# Patient Record
Sex: FEMALE | Race: WHITE | Employment: FULL TIME | URBAN - METROPOLITAN AREA
[De-identification: names, ages, dates, MRNs, and addresses within clinical notes are randomized per-mention and may not be internally consistent; named-entity substitution may affect disease eponyms.]

---

## 2023-03-15 ENCOUNTER — OFFICE VISIT (OUTPATIENT)
Dept: ORTHOPEDIC SURGERY | Age: 53
End: 2023-03-15
Payer: COMMERCIAL

## 2023-03-15 DIAGNOSIS — G89.29 CHRONIC PAIN OF LEFT ANKLE: Primary | ICD-10-CM

## 2023-03-15 DIAGNOSIS — M76.822 TIBIALIS TENDINITIS OF LEFT LOWER EXTREMITY: ICD-10-CM

## 2023-03-15 DIAGNOSIS — M25.572 CHRONIC PAIN OF LEFT ANKLE: Primary | ICD-10-CM

## 2023-03-15 PROCEDURE — 99214 OFFICE O/P EST MOD 30 MIN: CPT | Performed by: ORTHOPAEDIC SURGERY

## 2023-03-15 RX ORDER — PREDNISONE 10 MG/1
TABLET ORAL
COMMUNITY
Start: 2023-02-13

## 2023-03-15 NOTE — PROGRESS NOTES
Name: Cortney Correa  YOB: 1970  Gender: female  MRN: [id number]    Summary:   Left stage II posterior tibial tendon insufficiency       CC: New Patient (Left ankle  x-rays taken in office today)       HPI: Cortney Correa is a 45 y.o. female who presents with New Patient (Left ankle  x-rays taken in office today)  . Patient presents the office today with a 3 to 4-month history of worsening progressive flatfoot deformity and pain. She said she has tried to cortisone shots placed into the tendon area. She states she got some relief in the first were not for the second 1. She is tried bracing for this as well as anti-inflammatories with progressive worsening pain and deformity in the left foot and ankle. History was obtained by Patient     ROS/Meds/PSH/PMH/FH/SH: I personally reviewed the patients standard intake form. Below are the pertinents    Tobacco:  reports that she has never smoked. She does not have any smokeless tobacco history on file. Diabetes: None      Physical Examination:    Exam of the left lower extremity shows a planovalgus hindfoot with exquisite swelling and tenderness to palpation of the posterior tibial tendon. She is able to perform a double leg toe rise but cannot perform a single-leg toe rise. The hindfoot does invert nicely with good subtalar joint range of motion. There is no sinus Tarsi pain. She has palpable pulses and intact sensation. Imaging:   I independently interpreted XR taken today  Left ankle XR: AP, Lateral, Oblique views     ICD-10-CM    1. Chronic pain of left ankle  M25.572 XR ANKLE LEFT (MIN 3 VIEWS)    G89.29       2.  Tibialis tendinitis of left lower extremity  O66.453          Report: AP, lateral, oblique x-ray of the left ankle demonstrates increased talar first metatarsal angle and peritalar subluxation    Impression: Creased talar first metatarsal angle and peritalar subluxation   Last Briones III, MD           Assessment: Left stage II posterior tibial tendon insufficiency    Treatment Plan:   4 This is a chronic illness/condition with exacerbation and progression  Treatment at this time: Elective major surgery with procedural risk factors  Studies ordered: NO XR needed @ Next Visit    Weight-bearing status: WBAT        Return to work/work restrictions: none  No medications given    Left Spring ligament reconstruction with posterior tibial tendon reconstruction and possible flexor digitorum longus tendon transfer, medial calcaneal slide osteotomy, gastrocnemius recession, and possible cotton osteotomy  Outpatient-1 hour-sagittal saw with long blade, Arthrex 6.5 millimeter screws, C arm, possible Bio-Tenodesis set    The patient understands the diagnosis with conservative and surgical treatment. Surgery, the risks and complications, and the expected postoperative course were all outlined. Understands generalized risks and complications associated with any surgery, but specifically with the posterior tibial tendon repair/reconstruction with or without Achilles lengthening in the calcaneal osteotomy, understands malposition, malunion, and/or delayed union, any of which may require repeat surgical treatment. Understands the risk of skin and deep infection, the risk of bone infection, and the use of internal and possible external fixation that may require future hardware removal.  Understands the goal of surgery is attempted realignment and pain reduction with tendon healing but not complete pain relief. Understands posterior tibial tendon failure and the possible need to proceed with a fusion or long-term use of a brace or insole. Understands the risks of skin and deep infection, the risk of bone infection. Patient accepts and would like to proceed.

## 2023-03-20 ENCOUNTER — TELEPHONE (OUTPATIENT)
Dept: ORTHOPEDIC SURGERY | Age: 53
End: 2023-03-20

## 2023-03-20 NOTE — TELEPHONE ENCOUNTER
Pt  ask to speak w/ lizet and  please  check  to  see if  any of  these  surgery  dates  are available.   4/7 4/14  or  4/21

## 2023-03-23 DIAGNOSIS — M76.822 TIBIALIS TENDINITIS OF LEFT LOWER EXTREMITY: Primary | ICD-10-CM

## 2023-03-23 RX ORDER — IBUPROFEN 200 MG
200 TABLET ORAL EVERY 6 HOURS PRN
Status: ON HOLD | COMMUNITY
End: 2023-04-07 | Stop reason: HOSPADM

## 2023-03-23 RX ORDER — CALCIUM CARBONATE 200(500)MG
1 TABLET,CHEWABLE ORAL
COMMUNITY

## 2023-04-06 NOTE — PROGRESS NOTES
Preop department called to notify patient of arrival time for scheduled procedure. Instructions given to   - Arrive at [address]. - Remain NPO after midnight, unless otherwise indicated, including gum, mints, and ice chips. - Have a responsible adult to drive patient to the hospital, stay during surgery, and patient will need supervision 24 hours after anesthesia. - Use antibacterial soap in shower the night before surgery and on the morning of surgery.        Was patient contacted: yes, self  Voicemail left: n/a  Numbers contacted: [phone]   Arrival time: 1000

## 2023-04-07 ENCOUNTER — APPOINTMENT (OUTPATIENT)
Dept: GENERAL RADIOLOGY | Age: 53
End: 2023-04-07
Attending: ORTHOPAEDIC SURGERY
Payer: COMMERCIAL

## 2023-04-07 ENCOUNTER — HOSPITAL ENCOUNTER (OUTPATIENT)
Age: 53
Setting detail: OUTPATIENT SURGERY
Discharge: HOME | End: 2023-04-07
Attending: ORTHOPAEDIC SURGERY | Admitting: ORTHOPAEDIC SURGERY
Payer: COMMERCIAL

## 2023-04-07 ENCOUNTER — ANESTHESIA (OUTPATIENT)
Dept: SURGERY | Age: 53
End: 2023-04-07
Payer: COMMERCIAL

## 2023-04-07 ENCOUNTER — ANESTHESIA EVENT (OUTPATIENT)
Dept: SURGERY | Age: 53
End: 2023-04-07
Payer: COMMERCIAL

## 2023-04-07 VITALS
HEIGHT: 64 IN | SYSTOLIC BLOOD PRESSURE: 166 MMHG | WEIGHT: 293 LBS | RESPIRATION RATE: 16 BRPM | TEMPERATURE: 97.3 F | HEART RATE: 76 BPM | DIASTOLIC BLOOD PRESSURE: 78 MMHG | OXYGEN SATURATION: 99 % | BODY MASS INDEX: 50.02 KG/M2

## 2023-04-07 DIAGNOSIS — M76.822 TIBIALIS TENDINITIS OF LEFT LOWER EXTREMITY: ICD-10-CM

## 2023-04-07 DIAGNOSIS — G89.18 ACUTE POST-OPERATIVE PAIN: Primary | ICD-10-CM

## 2023-04-07 PROCEDURE — 3700000000 HC ANESTHESIA ATTENDED CARE: Performed by: ORTHOPAEDIC SURGERY

## 2023-04-07 PROCEDURE — C1769 GUIDE WIRE: HCPCS | Performed by: ORTHOPAEDIC SURGERY

## 2023-04-07 PROCEDURE — 6360000002 HC RX W HCPCS: Performed by: NURSE PRACTITIONER

## 2023-04-07 PROCEDURE — 3600000004 HC SURGERY LEVEL 4 BASE: Performed by: ORTHOPAEDIC SURGERY

## 2023-04-07 PROCEDURE — 6360000002 HC RX W HCPCS: Performed by: ANESTHESIOLOGY

## 2023-04-07 PROCEDURE — 64447 NJX AA&/STRD FEMORAL NRV IMG: CPT | Performed by: ANESTHESIOLOGY

## 2023-04-07 PROCEDURE — 7100000011 HC PHASE II RECOVERY - ADDTL 15 MIN: Performed by: ORTHOPAEDIC SURGERY

## 2023-04-07 PROCEDURE — 2580000003 HC RX 258: Performed by: ANESTHESIOLOGY

## 2023-04-07 PROCEDURE — 6360000002 HC RX W HCPCS: Performed by: NURSE ANESTHETIST, CERTIFIED REGISTERED

## 2023-04-07 PROCEDURE — 7100000010 HC PHASE II RECOVERY - FIRST 15 MIN: Performed by: ORTHOPAEDIC SURGERY

## 2023-04-07 PROCEDURE — 64445 NJX AA&/STRD SCIATIC NRV IMG: CPT | Performed by: ANESTHESIOLOGY

## 2023-04-07 PROCEDURE — 7100000000 HC PACU RECOVERY - FIRST 15 MIN: Performed by: ORTHOPAEDIC SURGERY

## 2023-04-07 PROCEDURE — 6370000000 HC RX 637 (ALT 250 FOR IP): Performed by: ANESTHESIOLOGY

## 2023-04-07 PROCEDURE — 2500000003 HC RX 250 WO HCPCS: Performed by: ANESTHESIOLOGY

## 2023-04-07 PROCEDURE — 3600000014 HC SURGERY LEVEL 4 ADDTL 15MIN: Performed by: ORTHOPAEDIC SURGERY

## 2023-04-07 PROCEDURE — C1713 ANCHOR/SCREW BN/BN,TIS/BN: HCPCS | Performed by: ORTHOPAEDIC SURGERY

## 2023-04-07 PROCEDURE — 2720000010 HC SURG SUPPLY STERILE: Performed by: ORTHOPAEDIC SURGERY

## 2023-04-07 PROCEDURE — 7100000001 HC PACU RECOVERY - ADDTL 15 MIN: Performed by: ORTHOPAEDIC SURGERY

## 2023-04-07 PROCEDURE — 2500000003 HC RX 250 WO HCPCS: Performed by: NURSE ANESTHETIST, CERTIFIED REGISTERED

## 2023-04-07 PROCEDURE — 2709999900 HC NON-CHARGEABLE SUPPLY: Performed by: ORTHOPAEDIC SURGERY

## 2023-04-07 PROCEDURE — 3700000001 HC ADD 15 MINUTES (ANESTHESIA): Performed by: ORTHOPAEDIC SURGERY

## 2023-04-07 DEVICE — SCREW BNE L55MM DIA6.7MM THRD L18MM FT ANK TI CANN LO PROF: Type: IMPLANTABLE DEVICE | Site: FOOT | Status: FUNCTIONAL

## 2023-04-07 DEVICE — SCREW INTFR L10MM DIA4MM BIOCOMPOSITE BIO-TENODESIS: Type: IMPLANTABLE DEVICE | Site: FOOT | Status: FUNCTIONAL

## 2023-04-07 DEVICE — WEDGE ANK D20MM THK7.5MM COT TECH TI PORUS ANAT 3D OPN: Type: IMPLANTABLE DEVICE | Site: FOOT | Status: FUNCTIONAL

## 2023-04-07 RX ORDER — SUCCINYLCHOLINE/SOD CL,ISO/PF 200MG/10ML
SYRINGE (ML) INTRAVENOUS PRN
Status: DISCONTINUED | OUTPATIENT
Start: 2023-04-07 | End: 2023-04-07 | Stop reason: SDUPTHER

## 2023-04-07 RX ORDER — OXYCODONE HYDROCHLORIDE 5 MG/1
5 TABLET ORAL
Status: COMPLETED | OUTPATIENT
Start: 2023-04-07 | End: 2023-04-07

## 2023-04-07 RX ORDER — FENTANYL CITRATE 50 UG/ML
INJECTION, SOLUTION INTRAMUSCULAR; INTRAVENOUS PRN
Status: DISCONTINUED | OUTPATIENT
Start: 2023-04-07 | End: 2023-04-07 | Stop reason: SDUPTHER

## 2023-04-07 RX ORDER — MIDAZOLAM HYDROCHLORIDE 1 MG/ML
4 INJECTION, SOLUTION INTRAMUSCULAR; INTRAVENOUS
Status: COMPLETED | OUTPATIENT
Start: 2023-04-07 | End: 2023-04-07

## 2023-04-07 RX ORDER — SODIUM CHLORIDE, SODIUM LACTATE, POTASSIUM CHLORIDE, CALCIUM CHLORIDE 600; 310; 30; 20 MG/100ML; MG/100ML; MG/100ML; MG/100ML
INJECTION, SOLUTION INTRAVENOUS CONTINUOUS
Status: DISCONTINUED | OUTPATIENT
Start: 2023-04-07 | End: 2023-04-07 | Stop reason: HOSPADM

## 2023-04-07 RX ORDER — ROPIVACAINE HYDROCHLORIDE 10 MG/ML
INJECTION EPIDURAL; INFILTRATION; PERINEURAL PRN
Status: DISCONTINUED | OUTPATIENT
Start: 2023-04-07 | End: 2023-04-07 | Stop reason: SDUPTHER

## 2023-04-07 RX ORDER — LIDOCAINE HYDROCHLORIDE 10 MG/ML
1 INJECTION, SOLUTION INFILTRATION; PERINEURAL
Status: DISCONTINUED | OUTPATIENT
Start: 2023-04-07 | End: 2023-04-07 | Stop reason: HOSPADM

## 2023-04-07 RX ORDER — LIDOCAINE HYDROCHLORIDE 20 MG/ML
INJECTION, SOLUTION EPIDURAL; INFILTRATION; INTRACAUDAL; PERINEURAL PRN
Status: DISCONTINUED | OUTPATIENT
Start: 2023-04-07 | End: 2023-04-07 | Stop reason: SDUPTHER

## 2023-04-07 RX ORDER — OXYCODONE HYDROCHLORIDE 5 MG/1
5 TABLET ORAL EVERY 6 HOURS PRN
Qty: 20 TABLET | Refills: 0 | Status: SHIPPED | OUTPATIENT
Start: 2023-04-07 | End: 2023-04-12

## 2023-04-07 RX ORDER — ROCURONIUM BROMIDE 10 MG/ML
INJECTION, SOLUTION INTRAVENOUS PRN
Status: DISCONTINUED | OUTPATIENT
Start: 2023-04-07 | End: 2023-04-07 | Stop reason: SDUPTHER

## 2023-04-07 RX ORDER — FENTANYL CITRATE 50 UG/ML
100 INJECTION, SOLUTION INTRAMUSCULAR; INTRAVENOUS
Status: COMPLETED | OUTPATIENT
Start: 2023-04-07 | End: 2023-04-07

## 2023-04-07 RX ORDER — SODIUM CHLORIDE 9 MG/ML
INJECTION, SOLUTION INTRAVENOUS PRN
Status: DISCONTINUED | OUTPATIENT
Start: 2023-04-07 | End: 2023-04-07 | Stop reason: HOSPADM

## 2023-04-07 RX ORDER — PROPOFOL 10 MG/ML
INJECTION, EMULSION INTRAVENOUS PRN
Status: DISCONTINUED | OUTPATIENT
Start: 2023-04-07 | End: 2023-04-07 | Stop reason: SDUPTHER

## 2023-04-07 RX ORDER — SODIUM CHLORIDE 0.9 % (FLUSH) 0.9 %
5-40 SYRINGE (ML) INJECTION PRN
Status: DISCONTINUED | OUTPATIENT
Start: 2023-04-07 | End: 2023-04-07 | Stop reason: HOSPADM

## 2023-04-07 RX ORDER — ROPIVACAINE HYDROCHLORIDE 5 MG/ML
INJECTION, SOLUTION EPIDURAL; INFILTRATION; PERINEURAL
Status: COMPLETED | OUTPATIENT
Start: 2023-04-07 | End: 2023-04-07

## 2023-04-07 RX ORDER — DEXAMETHASONE SODIUM PHOSPHATE 4 MG/ML
INJECTION, SOLUTION INTRA-ARTICULAR; INTRALESIONAL; INTRAMUSCULAR; INTRAVENOUS; SOFT TISSUE PRN
Status: DISCONTINUED | OUTPATIENT
Start: 2023-04-07 | End: 2023-04-07 | Stop reason: SDUPTHER

## 2023-04-07 RX ORDER — EPHEDRINE SULFATE/0.9% NACL/PF 50 MG/5 ML
SYRINGE (ML) INTRAVENOUS PRN
Status: DISCONTINUED | OUTPATIENT
Start: 2023-04-07 | End: 2023-04-07 | Stop reason: SDUPTHER

## 2023-04-07 RX ORDER — CEPHALEXIN 500 MG/1
500 CAPSULE ORAL 4 TIMES DAILY
Qty: 12 CAPSULE | Refills: 0 | Status: SHIPPED | OUTPATIENT
Start: 2023-04-07

## 2023-04-07 RX ORDER — ONDANSETRON 2 MG/ML
4 INJECTION INTRAMUSCULAR; INTRAVENOUS
Status: DISCONTINUED | OUTPATIENT
Start: 2023-04-07 | End: 2023-04-07 | Stop reason: HOSPADM

## 2023-04-07 RX ORDER — ONDANSETRON 2 MG/ML
INJECTION INTRAMUSCULAR; INTRAVENOUS PRN
Status: DISCONTINUED | OUTPATIENT
Start: 2023-04-07 | End: 2023-04-07 | Stop reason: SDUPTHER

## 2023-04-07 RX ORDER — SODIUM CHLORIDE 0.9 % (FLUSH) 0.9 %
5-40 SYRINGE (ML) INJECTION EVERY 12 HOURS SCHEDULED
Status: DISCONTINUED | OUTPATIENT
Start: 2023-04-07 | End: 2023-04-07 | Stop reason: HOSPADM

## 2023-04-07 RX ORDER — HYDROMORPHONE HYDROCHLORIDE 2 MG/ML
0.5 INJECTION, SOLUTION INTRAMUSCULAR; INTRAVENOUS; SUBCUTANEOUS EVERY 5 MIN PRN
Status: DISCONTINUED | OUTPATIENT
Start: 2023-04-07 | End: 2023-04-07 | Stop reason: HOSPADM

## 2023-04-07 RX ADMIN — Medication 3000 MG: at 12:25

## 2023-04-07 RX ADMIN — FENTANYL CITRATE 50 MCG: 50 INJECTION, SOLUTION INTRAMUSCULAR; INTRAVENOUS at 13:14

## 2023-04-07 RX ADMIN — ROCURONIUM BROMIDE 5 MG: 50 INJECTION, SOLUTION INTRAVENOUS at 12:18

## 2023-04-07 RX ADMIN — ROPIVACAINE HYDROCHLORIDE 20 ML: 10 INJECTION, SOLUTION EPIDURAL at 11:05

## 2023-04-07 RX ADMIN — LIDOCAINE HYDROCHLORIDE 100 MG: 20 INJECTION, SOLUTION EPIDURAL; INFILTRATION; INTRACAUDAL; PERINEURAL at 12:18

## 2023-04-07 RX ADMIN — MIDAZOLAM 3 MG: 1 INJECTION INTRAMUSCULAR; INTRAVENOUS at 11:05

## 2023-04-07 RX ADMIN — DEXAMETHASONE SODIUM PHOSPHATE 4 MG: 4 INJECTION, SOLUTION INTRAMUSCULAR; INTRAVENOUS at 12:52

## 2023-04-07 RX ADMIN — FENTANYL CITRATE 50 MCG: 50 INJECTION, SOLUTION INTRAMUSCULAR; INTRAVENOUS at 12:52

## 2023-04-07 RX ADMIN — OXYCODONE 5 MG: 5 TABLET ORAL at 14:04

## 2023-04-07 RX ADMIN — SODIUM CHLORIDE, SODIUM LACTATE, POTASSIUM CHLORIDE, AND CALCIUM CHLORIDE: 600; 310; 30; 20 INJECTION, SOLUTION INTRAVENOUS at 11:06

## 2023-04-07 RX ADMIN — PROPOFOL 200 MG: 10 INJECTION, EMULSION INTRAVENOUS at 12:18

## 2023-04-07 RX ADMIN — FENTANYL CITRATE 50 MCG: 50 INJECTION INTRAMUSCULAR; INTRAVENOUS at 11:05

## 2023-04-07 RX ADMIN — DEXAMETHASONE SODIUM PHOSPHATE 4 MG: 4 INJECTION, SOLUTION INTRAMUSCULAR; INTRAVENOUS at 11:05

## 2023-04-07 RX ADMIN — MEPIVACAINE HYDROCHLORIDE 20 ML: 15 INJECTION, SOLUTION EPIDURAL; INFILTRATION at 11:05

## 2023-04-07 RX ADMIN — ROPIVACAINE HYDROCHLORIDE 30 ML: 5 INJECTION, SOLUTION EPIDURAL; INFILTRATION; PERINEURAL at 11:11

## 2023-04-07 RX ADMIN — ONDANSETRON 4 MG: 2 INJECTION INTRAMUSCULAR; INTRAVENOUS at 13:16

## 2023-04-07 RX ADMIN — Medication 200 MG: at 12:19

## 2023-04-07 RX ADMIN — Medication 10 MG: at 13:20

## 2023-04-07 ASSESSMENT — PAIN DESCRIPTION - DESCRIPTORS: DESCRIPTORS: ACHING

## 2023-04-07 ASSESSMENT — PAIN SCALES - GENERAL: PAINLEVEL_OUTOF10: 4

## 2023-04-07 ASSESSMENT — PAIN - FUNCTIONAL ASSESSMENT: PAIN_FUNCTIONAL_ASSESSMENT: 0-10

## 2023-04-07 ASSESSMENT — PAIN DESCRIPTION - ORIENTATION: ORIENTATION: LEFT

## 2023-04-07 ASSESSMENT — PAIN DESCRIPTION - LOCATION: LOCATION: ANKLE

## 2023-04-07 NOTE — PERIOP NOTE
Patient verified name and . Order for consent NOT found in EHR; patient verifies procedure. Type 1b surgery, phone assessment complete. Orders NOT received. Labs per surgeon: Unknown  Labs per anesthesia protocol: None per protocol    Patient answered medical/surgical history questions at their best of ability. All prior to admission medications documented in [facility]. If you have not heard from Memo ANDREY Guevara by 2 pm, please call [phone]. Patient instructed to take the following medications the day of surgery according to anesthesia guidelines with a small sip of water: NONE. On the day before surgery please take Acetaminophen 1000mg in the morning and then again before bed. You may substitute for Tylenol 650 mg. Hold all vitamins 7 days prior to surgery and NSAIDS 5 days prior to surgery. Prescription meds to hold: NONE    Patient instructed on the following:    > Arrive at [facility], time of arrival to be called the day before by 1700  > NPO after midnight, unless otherwise indicated, including gum, mints, and ice chips  > Responsible adult must drive patient to the hospital, stay during surgery, and patient will need supervision 24 hours after anesthesia  > Use antibacterial soap in shower the night before surgery and on the morning of surgery  > All piercings must be removed prior to arrival.    > Leave all valuables (money and jewelry) at home but bring insurance card and ID on DOS.   > You may be required to pay a deductible or co-pay on the day of your procedure. You can pre-pay by calling [phone] if your surgery is at the [facility] or [phone] if your surgery is at the [facility]. > Do not wear make-up, nail polish, lotions, cologne, perfumes, powders, or oil on skin. Artificial nails are not permitted.
Pt c/o pain operative ankle, see EMAR
Pt signed pregnancy test refusal form and states there is no way possible she could be pregnant. Form placed on chart.
Pt voided in toilet, waiting on ride
wear: deodorant, make-up, nail polish, lotions, cologne, perfumes, powders or oil on your skin. All piercings/metal/jewelry must be removed prior to arrival.  If you wear contacts then you will need to bring a case to store them in or wear your glasses. Artificial nails are not permitted. Please leave all your valuables at home but be sure to bring your insurance card and ID on the day of surgery for registration/identification. Our Guide to Surgery with additional information can be found:  [email or web address]    Emailed to:  Gabriela with [facility]

## 2023-04-07 NOTE — ANESTHESIA POSTPROCEDURE EVALUATION
Department of Anesthesiology  Postprocedure Note    Patient: Christiana Pino  MRN: [id number]  YOB: 1970  Date of evaluation: 4/7/2023      Procedure Summary     Date: 04/07/23 Room / Location: [facility] OP OR 01 / [facility]    Anesthesia Start: 1211 Anesthesia Stop: 1354    Procedures:       FOOT TENDON TRANSFER REPAIR (Left: Foot)      Cotton osteotomy (Left: Toes)      medial slide calcaneal osteotomy, posterior tibial tendon reconstruction CPT code 01075 (Left: Ankle)      spring ligament reconstruction (Left: Ankle)      GASTROCNEMIUS RECESSION (Left: Foot) Diagnosis:       Tibialis tendinitis of left lower extremity      (Tibialis tendinitis of left lower extremity [Z88.928])    Surgeons: Gaby Linda MD Responsible Provider: Sarah Neville MD    Anesthesia Type: General ASA Status: 3          Anesthesia Type: General    Surjit Phase I: Surjit Score: 7    Surjit Phase II:        Anesthesia Post Evaluation    Patient location during evaluation: PACU  Patient participation: complete - patient participated  Level of consciousness: awake and alert  Pain score: 2  Airway patency: patent  Nausea & Vomiting: no nausea and no vomiting  Complications: no  Cardiovascular status: blood pressure returned to baseline  Respiratory status: acceptable  Hydration status: euvolemic  Comments: BP (!) 146/58   Pulse 87   Temp 97.3 °F (36.3 °C) (Temporal)   Resp 16   Ht 5' 4\" (1.626 m)   Wt (!) 305 lb (138.3 kg)   SpO2 94%   BMI 52.35 kg/m²   Multimodal analgesia pain management approach

## 2023-04-07 NOTE — OP NOTE
Operative Note    Patient:Mariann Gonsalez  MRN: [id number]    Date Of Surgery: 4/7/2023    Surgeon: Мария Rodriguez MD    Assistant Surgeon: None    Pre Op Diagnosis:  Tibialis tendinitis of left lower extremity [M76.822]  Left Achilles contracture and planovalgus hindfoot  Post Op Diagnosis:   same    Procedures Performed:  Left gastrocnemius recession, 59580  Left medial displacement calcaneal osteotomy, 53470  Left posterior tibial tendon debridement with flexor digitorum longus tendon transfer, 07872  Left spring ligament reconstruction of the ankle, 37176  Left cotton medial cuneiform opening wedge osteotomy, 18245    Implants:   Implant Name Type Inv. Item Serial No.  Lot No. LRB No. Used Action   SCREW INTFR L10MM DIA4MM BIOCOMPOSITE BIO-TENODESIS - [id number]  SCREW INTFR L10MM DIA4MM BIOCOMPOSITE BIO-TENODESIS  [employer] [id number] Left 1 Implanted   SCREW BNE L55MM DIA6.7MM THRD L18MM FT ANK TI CHRIS LO PROF - [id number]  SCREW BNE L55MM DIA6.7MM THRD L18MM FT ANK TI CHRIS LO PROF  [employer] [id number] Left 1 Implanted   WEDGE ANK D20MM THK7.5MM COT TECH TI PORUS ELADIO 3D OPN - [id number]  WEDGE ANK D20MM THK7.5MM COT TECH TI PORUS ELADIO 3D OPN  [employer] [id number] Left 1 Implanted       Anesthesia:  Choice    Blood Loss:  minimal    Tourniquet:  Estimated calf 52 minutes    Pre Operative Abx:   Ancef 3g            Pre Operative Course:  Dorita Basilio is a 45 y.o. female who has a history of left posterior tibial tendinitis who has failed conservative treatment. We have discussed the possible need for a fusion if a soft tissue procedure flatfoot reconstruction was not viable. Operation In Detail:  Patient was evaluated in the preoperative area. We had a long discussion about the procedure and postoperative protocols. The patient was then brought back to the operating room suite and placed in the operating room table.   A timeout was taken to identify the patient, procedure

## 2023-04-07 NOTE — H&P
Outpatient Surgery History and Physical:  Thiago Morin was seen and examined. CHIEF COMPLAINT:   left foot. PE:   /62   Pulse 81   Temp 98 °F (36.7 °C) (Oral)   Resp 12   Ht 5' 4\" (1.626 m)   Wt (!) 305 lb (138.3 kg)   SpO2 100%   BMI 52.35 kg/m²     Heart:   Regular rhythm      Lungs:  Are clear      Past Medical History:    Past Medical History:   Diagnosis Date    Acid reflux     tums PRN       Surgical History: History reviewed. No pertinent surgical history. Social History: Patient  reports that she has never smoked. She has never used smokeless tobacco. She reports current alcohol use. She reports that she does not currently use drugs. Family History: History reviewed. No pertinent family history. Allergies: Reviewed per EMR  Sulfa antibiotics    Medications:    Prior to Admission medications    Medication Sig Start Date End Date Taking? Authorizing Provider   ibuprofen (ADVIL;MOTRIN) 200 MG tablet Take 1 tablet by mouth every 6 hours as needed for Pain   Yes Historical Provider, MD   calcium carbonate (TUMS) 500 MG chewable tablet Take 1 tablet by mouth three times a week   Yes Historical Provider, MD       The surgery is planned for the Cotton osteotomy - Left, Choice  medial slide calcaneal osteotomy, posterior tibial tendon reconstruction CPT code 66272 - Left, Choice  spring ligament reconstruction - Left, Choice  GASTROCNEMIUS RECESSION . History and physical has been reviewed. The patient has been examined. There have been no significant clinical changes since the completion of the originally dated History and Physical.  Patient identified by surgeon; surgical site was confirmed by patient and surgeon. The patient is here today for outpatient surgery. I have examined the patient, no changes are noted in the patient's medical status. Necessity for the procedure/care is still present and the history and physical above is current.   See the office notes for

## 2023-04-07 NOTE — ANESTHESIA PRE PROCEDURE
Allergies   Allergen Reactions    Sulfa Antibiotics Hives, Itching and Rash       Problem List:    Patient Active Problem List   Diagnosis Code    Tibialis tendinitis of left lower extremity I42.556       Past Medical History:        Diagnosis Date    Acid reflux     tums PRN       Past Surgical History:  History reviewed. No pertinent surgical history. Social History:    Social History     Tobacco Use    Smoking status: Never    Smokeless tobacco: Never   Substance Use Topics    Alcohol use: Yes     Comment: very rare                                Counseling given: Not Answered      Vital Signs (Current):   Vitals:    03/23/23 1402 04/07/23 1020 04/07/23 1036   BP:   (!) 153/67   Pulse:   81   Resp:   14   Temp:   98 °F (36.7 °C)   TempSrc:   Oral   SpO2:   97%   Weight: (!) 302 lb (137 kg) (!) 305 lb (138.3 kg)    Height: 5' 5\" (1.651 m) 5' 4\" (1.626 m)                                               BP Readings from Last 3 Encounters:   04/07/23 (!) 153/67       NPO Status: Time of last liquid consumption: 1800                        Time of last solid consumption: 1800                        Date of last liquid consumption: 04/06/23                        Date of last solid food consumption: 04/06/23    BMI:   Wt Readings from Last 3 Encounters:   04/07/23 (!) 305 lb (138.3 kg)     Body mass index is 52.35 kg/m². CBC: No results found for: WBC, RBC, HGB, HCT, MCV, RDW, PLT    CMP: No results found for: NA, K, CL, CO2, BUN, CREATININE, GFRAA, AGRATIO, LABGLOM, GLUCOSE, GLU, PROT, CALCIUM, BILITOT, ALKPHOS, AST, ALT    POC Tests: No results for input(s): POCGLU, POCNA, POCK, POCCL, POCBUN, POCHEMO, POCHCT in the last 72 hours.     Coags: No results found for: PROTIME, INR, APTT    HCG (If Applicable): No results found for: PREGTESTUR, PREGSERUM, HCG, HCGQUANT     ABGs: No results found for: PHART, PO2ART, CKS3VEK, GCJ4MKY, BEART, O0QZJWEK     Type & Screen (If Applicable):  No results found for:

## 2023-04-07 NOTE — ANESTHESIA PROCEDURE NOTES
Airway  Date/Time: 4/7/2023 12:21 PM  Urgency: elective    Airway not difficult    General Information and Staff    Patient location during procedure: OR  Resident/CRNA: NATHALY Chu CRNA  Performed: resident/CRNA     Indications and Patient Condition  Indications for airway management: anesthesia  Spontaneous Ventilation: absent  Sedation level: deep  Preoxygenated: yes  Patient position: sniffing  MILS maintained throughout  Mask difficulty assessment: vent by bag mask    Final Airway Details  Final airway type: endotracheal airway      Successful airway: ETT  Cuffed: yes   Successful intubation technique: direct laryngoscopy  Facilitating devices/methods: intubating stylet  Endotracheal tube insertion site: oral  Blade: Viky  Blade size: #3  ETT size (mm): 7.0  Cormack-Lehane Classification: grade IIa - partial view of glottis  Placement verified by: chest auscultation and capnometry   Measured from: lips  ETT to lips (cm): 21  Number of attempts at approach: 1  Number of other approaches attempted: 0
(NAROPIN) injection 0.5% - Perineural   30 mL - 4/7/2023 11:11:00 AM
decadron and 1;200,000epi  Medications Administered  dexamethasone (DECADRON) injection 4 mg/mL - Perineural   4 mg - 4/7/2023 11:05:00 AM  mepivacaine (CARBOCAINE) injection 1.5% - Perineural   20 mL - 4/7/2023 11:05:00 AM

## 2023-04-21 ENCOUNTER — HOSPITAL ENCOUNTER (OUTPATIENT)
Dept: ULTRASOUND IMAGING | Age: 53
Discharge: HOME | End: 2023-04-24

## 2023-04-21 ENCOUNTER — OFFICE VISIT (OUTPATIENT)
Dept: ORTHOPEDIC SURGERY | Age: 53
End: 2023-04-21

## 2023-04-21 ENCOUNTER — CLINICAL DOCUMENTATION (OUTPATIENT)
Dept: ORTHOPEDIC SURGERY | Age: 53
End: 2023-04-21

## 2023-04-21 DIAGNOSIS — G89.29 CHRONIC PAIN OF LEFT ANKLE: ICD-10-CM

## 2023-04-21 DIAGNOSIS — M25.572 CHRONIC PAIN OF LEFT ANKLE: ICD-10-CM

## 2023-04-21 DIAGNOSIS — M76.822 TIBIALIS TENDINITIS OF LEFT LOWER EXTREMITY: ICD-10-CM

## 2023-04-21 DIAGNOSIS — G89.18 POST-OP PAIN: ICD-10-CM

## 2023-04-21 DIAGNOSIS — G89.18 POST-OP PAIN: Primary | ICD-10-CM

## 2023-04-21 PROCEDURE — 99024 POSTOP FOLLOW-UP VISIT: CPT | Performed by: NURSE PRACTITIONER

## 2023-04-21 NOTE — PROGRESS NOTES
The patient was prescribed a walker boot for the patient's left foot. The patient wears a size 8 shoe and I fitted them with a M size boot. The patient was fitted and instructed on the use of prescribed walker boot. I explained how to fit themselves and that the plastic flexible piece should always be on the front of the boot and secured by the Velcro straps on top. The air bladder in the boot was adjusted according to proper fit and comfort. The patient walked a short distance and acknowledged satisfaction with current fit. I also explained that they need a heel lift or a higher heeled shoe for the uninvolved LE to help normalize gait and avoid excessive low back stress/strain due to leg length inequality created from walker boot. Patient read and signed documenting they understand and agree to [facility]'s current DME return policy.
get the affected extremity wet including showering and soaking as needed, recommendation of Epsom salts was given to help with additional incisional healing as well as swelling purposes. She will follow-up in approximately 3 weeks or sooner if needed for x-ray. Studies ordered:  Lower extremity Doppler for evaluation of DVT and Foot XR needed @ Next Visit    Weight-bearing status: NWB        Return to work/work restrictions:  Patient is to remain out of work until her next follow-up visit.   No medications given

## 2023-04-24 ENCOUNTER — CLINICAL DOCUMENTATION (OUTPATIENT)
Dept: ORTHOPEDIC SURGERY | Age: 53
End: 2023-04-24

## 2023-05-01 ENCOUNTER — TELEPHONE (OUTPATIENT)
Dept: ORTHOPEDIC SURGERY | Age: 53
End: 2023-05-01

## 2023-05-12 ENCOUNTER — OFFICE VISIT (OUTPATIENT)
Dept: ORTHOPEDIC SURGERY | Age: 53
End: 2023-05-12

## 2023-05-12 DIAGNOSIS — M25.572 CHRONIC PAIN OF LEFT ANKLE: ICD-10-CM

## 2023-05-12 DIAGNOSIS — G89.29 CHRONIC PAIN OF LEFT ANKLE: ICD-10-CM

## 2023-05-12 DIAGNOSIS — M76.822 TIBIALIS TENDINITIS OF LEFT LOWER EXTREMITY: Primary | ICD-10-CM

## 2023-05-12 PROCEDURE — 99024 POSTOP FOLLOW-UP VISIT: CPT | Performed by: NURSE PRACTITIONER

## 2023-05-12 NOTE — PROGRESS NOTES
Name: Joaquina Vicente  YOB: 1970  Gender: female  MRN: [id number]    Procedure Performed:  Left gastrocnemius recession  Left medial displacement calcaneal osteotomy  Left posterior tibial tendon debridement with flexor digitorum longus tendon transfer  Left spring ligament reconstruction of the ankle  Left cotton medial cuneiform opening wedge osteotomy           Date of Procedure: 04/07/2023      Subjective: Patient reports she is doing much better than on her last visit. She is very happy about being able to bear weight on the affected extremity although hesitant at the same time. Physical Examination: The incisional areas are continuing to heal well and show no signs of infection. They are scabbed throughout most of the incisions. She has palpable pulses and intact sensation to the foot. She has the presence of an arch in her foot. She has no signs of DVT at this time, denies of any calf or behind the knee pain and does present with a negative Homans' sign. Imaging:   I independently interpreted XR taken today  Left foot XR: AP, Lateral, Oblique views     ICD-10-CM    1. Tibialis tendinitis of left lower extremity  M76.822 XR FOOT LEFT (MIN 3 VIEWS)      2. Chronic pain of left ankle  M25.572 XR FOOT LEFT (MIN 3 VIEWS)    G89.29          Report: AP, lateral, oblique x-ray of the left foot demonstrates no hardware failures    Impression: No hardware failures   NATHALY Boyle CNP           Assessment:   Status post flatfoot reconstruction. Plan:   3 This is stable chronic illness/condition  Treatment at this time:  Patient will continue to wear the cam walker boot at this time only now she may progressively bear weight on the affected extremity as she can tolerate and swelling allows. She will continue to elevate the affected extremity for swelling. She was encouraged to continue soaking with Epsom salts to promote additional incisional healing.   She

## 2023-05-17 ENCOUNTER — HOSPITAL ENCOUNTER (OUTPATIENT)
Dept: ULTRASOUND IMAGING | Age: 53
Discharge: HOME | End: 2023-05-20
Payer: COMMERCIAL

## 2023-05-17 ENCOUNTER — TELEPHONE (OUTPATIENT)
Dept: ORTHOPEDIC SURGERY | Age: 53
End: 2023-05-17

## 2023-05-17 ENCOUNTER — PATIENT MESSAGE (OUTPATIENT)
Dept: ORTHOPEDIC SURGERY | Age: 53
End: 2023-05-17

## 2023-05-17 DIAGNOSIS — M79.662 PAIN OF LEFT CALF: ICD-10-CM

## 2023-05-17 DIAGNOSIS — M79.662 PAIN OF LEFT CALF: Primary | ICD-10-CM

## 2023-05-17 PROCEDURE — 93971 EXTREMITY STUDY: CPT

## 2023-05-17 NOTE — TELEPHONE ENCOUNTER
Spoke to Pt and advised US was Neg for DVT. Pt states she is still not able to walk well in the boot. Pt was advised to try taking Tylenol q4-6 hrs  PRN for pain for next few days while she is trying to increase her WB at home. Pt voiced complete understanding and declined pain meds at this time. Pt will call with update and let us know how she is doing.  [address]

## 2023-05-17 NOTE — TELEPHONE ENCOUNTER
Returned call to Pt and she states she is having Left calf pain while trying to walk. Pt denies redness or streaking. Pt was advised we will order a STAT US to R/O DVT. [facility]. Pt made aware and voiced complete understanding.  [address]

## 2023-05-17 NOTE — TELEPHONE ENCOUNTER
----- Message from [address] [place], [place] [address]Thuy Gerber sent at 5/17/2023  1:36 PM EDT -----  Regarding: Lt calf pain  Contact: [phone]  Im so sorry to bother you with this but Im not sure if this is normal: I took a few steps today with my boot on of course, and in doing so, my left calf snapped and had severe, sharp, stabbing pain. It has subsided except when I flex it- still have a little pain. I have been doing the exercises often. Im just nervous that I may have done something; Im probably being over cautious.

## 2023-06-19 ENCOUNTER — PATIENT MESSAGE (OUTPATIENT)
Dept: ORTHOPEDIC SURGERY | Age: 53
End: 2023-06-19

## 2023-06-20 NOTE — TELEPHONE ENCOUNTER
----- Message from NATHALY Hummel CNP sent at 6/20/2023  6:57 AM EDT -----  Regarding: RE: Eliquis  Contact: [phone]  If she is moving around well in the boot, she may stop the eliquis. [facility]       ----- Message -----  From: Flavia Burton MA  Sent: 6/19/2023   1:28 PM EDT  To: NATHALY Hummel CNP  Subject: FW: Eliquis                                      Spoke to Pt she Denies any warmth,redness in the knee or pain in the calf she states she has been using ice and an ace wrap for the knee. She still wants to know if she needs to continue the Eliquis? ?[address]  ----- Message -----  From: NATHALY Hummel CNP  Sent: 6/19/2023   1:03 PM EDT  To: Flavia Burton MA  Subject: RE: Eliquis                                      I am not sure why her knee is bothering her other than possibly because of the boot, or because the mechanics of her foot has changed now from the surgery. You can send her for a doppler if you speak with her and she has other indications.     ----- Message -----  From: Flavia Burton MA  Sent: 6/19/2023   1:00 PM EDT  To: NATHALY Hummel CNP  Subject: FW: Eliquis                                      Please advise.   ----- Message -----  From: Steve Juarez  Sent: 6/19/2023  11:38 AM EDT  To: , #  Subject: Eliquis                                          I forgot to ask when I was in this past Friday, am I able to stop the blood thinner med now? I have been walking on my foot without the boot. I am experiencing severe LT knee pain - I cant bend it, lift it, turn it without excruciating pain. I am at work and sitting.

## 2023-06-21 ENCOUNTER — HOSPITAL ENCOUNTER (OUTPATIENT)
Dept: PHYSICAL THERAPY | Age: 53
Setting detail: RECURRING SERIES
Discharge: HOME | End: 2023-06-24
Payer: COMMERCIAL

## 2023-06-21 ENCOUNTER — OFFICE VISIT (OUTPATIENT)
Dept: ORTHOPEDIC SURGERY | Age: 53
End: 2023-06-21

## 2023-06-21 ENCOUNTER — PATIENT MESSAGE (OUTPATIENT)
Dept: ORTHOPEDIC SURGERY | Age: 53
End: 2023-06-21

## 2023-06-21 DIAGNOSIS — R60.0 EDEMA OF LEFT LOWER EXTREMITY: ICD-10-CM

## 2023-06-21 DIAGNOSIS — M76.822 TIBIALIS TENDINITIS OF LEFT LOWER EXTREMITY: Primary | ICD-10-CM

## 2023-06-21 DIAGNOSIS — M25.572 CHRONIC PAIN OF LEFT ANKLE: ICD-10-CM

## 2023-06-21 DIAGNOSIS — G89.29 CHRONIC PAIN OF LEFT ANKLE: ICD-10-CM

## 2023-06-21 PROCEDURE — 97161 PT EVAL LOW COMPLEX 20 MIN: CPT

## 2023-06-21 PROCEDURE — 97110 THERAPEUTIC EXERCISES: CPT

## 2023-06-21 PROCEDURE — 99024 POSTOP FOLLOW-UP VISIT: CPT | Performed by: NURSE PRACTITIONER

## 2023-06-21 PROCEDURE — 97140 MANUAL THERAPY 1/> REGIONS: CPT

## 2023-06-21 NOTE — TELEPHONE ENCOUNTER
Spoke to pt and she was given an appt today @ 1:20pm for eval. Lizzie Guzman. Pt voiced complete understanding.  [address]

## 2023-06-21 NOTE — TELEPHONE ENCOUNTER
----- Message from [address] [place], [place] [address]Thuy Gerber sent at 6/21/2023 10:50 AM EDT -----  Regarding: Swelling  Contact: [phone]  Im concerned with the amount of swelling my foot and ankle have. I cannot wear any shoe or even slipper on it, and its extremely tight . Im unable to elevate it while at work. Is this normal? How long will it take for swelling to subside? My right ankle is also beginning to swell- maybe compensating. I am walking on my left foot without boot on.

## 2023-06-21 NOTE — PROGRESS NOTES
Name: Elodia Chadwick  YOB: 1970  Gender: female  MRN: [id number]    Procedure Performed:  Left gastrocnemius recession  Left medial displacement calcaneal osteotomy  Left posterior tibial tendon debridement with flexor digitorum longus tendon transfer  Left spring ligament reconstruction of the ankle  Left cotton medial cuneiform opening wedge osteotomy           Date of Procedure: 04/07/2023         Subjective: Patient is seen again today for concerns for swelling along with the patient is about recovery process. Physical Examination: The incisional areas all are well-healed without signs of infection. She has palpable pulses and intact sensation to the foot. There is the presence of an arch in the foot. She still has significant swelling throughout the entire foot, toes and ankle. There is expected stiffness due to the swelling with range of motion to the ankle. Imaging:   No imaging reviewed          Assessment:   Status post left flatfoot reconstruction. Discussed the process of recovery today which usually takes a full year to be relieved of all postoperative symptoms. We also discussed incorporating edema therapy into her physical therapy today, in hopes that this will address and help alleviate the moderate amount of swelling she continues to have. She does report that she is not really able to elevate it at work which she has returned to doing and only elevates in the evening time which does seem to help however does not completely get rid of the swelling. Plan:   3 This is stable chronic illness/condition  Treatment at this time:  Lymphedema therapy was added to the previous treatment plan. She will follow-up in 3 months or sooner if needed. With x-ray. Studies ordered:  Foot XR needed @ Next Visit    Weight-bearing status: WBAT        Return to work/work restrictions: none  No medications given

## 2023-06-22 ASSESSMENT — PAIN SCALES - GENERAL: PAINLEVEL_OUTOF10: 6

## 2023-06-22 NOTE — THERAPY EVALUATION
Nevin Salguero  : 1970  Primary: [employer]  Secondary:  [address] @ [address] [place] [address]  Phone: [phone]  Fax: [phone] Plan Frequency: 2 times a week for 8 weeks    Plan of Care/Certification Expiration Date: 23 (start of plan of care 2023)      PT Visit Info:  Plan Frequency: 2 times a week for 8 weeks  Plan of Care/Certification Expiration Date: 23 (start of plan of care 2023)      Visit Count:  1                OUTPATIENT PHYSICAL THERAPY:             OP NOTE TYPE: Initial Assessment 2023               Episode (L ankle) Appt Desk         Treatment Diagnosis:    Pain in left ankle and joints of left foot (M25.572)  Tibialis tendinitis of left lower extremity (R37.434)  Other abnormalities of gait and mobility (R26.89)  Chronic pain (G89.29)  Medical/Referring Diagnosis:  Tibialis tendinitis of left lower extremity [M76.822]  Chronic pain of left ankle [B64.080, G89.29]  Referring Physician:  Alberto Browning, APRN - CNP  MD Orders:  PT Eval and Treat   Return MD Appt:  2023  Date of Onset:  Onset Date: 23 (s/p L ankle surgery- details below)    s/p Left gastrocnemius recession, medial displacement calcaneal osteotomy, posterior tibial tendon debridement with flexor digitorum longus tendon transfer, spring ligament reconstruction of the ankle, and cotton medial cuneiform opening wedge osteotomy  Allergies:  Sulfa antibiotics  Restrictions/Precautions:    Restrictions/Precautions: Weight Bearing  Left Lower Extremity Weight Bearing: Weight Bearing As Tolerated       Medications Last Reviewed:  2023     SUBJECTIVE   History of Injury/Illness (Reason for Referral):  Ms. Thom Glasgow is a 45 y.o. female presenting to physical therapy with complaints of L foot/ ankle pain and stiffness s/p Left gastrocnemius recession, medial displacement calcaneal

## 2023-06-22 NOTE — PROGRESS NOTES
prop her L LE. States she was unable to don bilateral shoes and is wearing slippers  >Initial:     6 (at worst)/10>Post Session:       2/10  Medications Last Reviewed:  6/21/2023  Updated Objective Findings:  See evaluation note from today  Treatment   THERAPEUTIC EXERCISE: (23 minutes):    Exercises per grid below to improve mobility, strength, balance, and coordination. Required moderate visual, verbal, manual, and tactile cues to promote proper body alignment, promote proper body posture, and promote proper body mechanics. Progressed resistance, range, repetitions, and complexity of movement as indicated. Date:  6/21/2023 Date:   Date:     Activity/Exercise Parameters Parameters Parameters   Ankle pumps 2 x 20     Ankle circles X 10 each     Ankle ABCs X 1     Calf stretch Strap, x 3     Hamstring stretch Strap, x 3                   Time spent with patient reviewing proper muscle recruitment and technique with exercises. MANUAL THERAPY: (13 minutes):   Joint mobilization, Soft tissue mobilization, and Manipulation was utilized and necessary because of the patient's restricted joint motion, painful spasm, loss of articular motion, and restricted motion of soft tissue. Milking massage to L foot, ankle, and lower leg to decrease edema and improve mobility    MODALITIES: ( minutes):        None today      HEP: As above; handouts given to patient for all exercises. Treatment/Session Summary:    >Treatment Assessment:  Patient with good understanding of HEP, progression of therapy sessions, safety at home, and use of ice/ elevation for edema. Spoke with patient about compression socks as well. Communication/Consultation:  Spoke with patient in regards to plan of care, HEP, progression of therapy sessions, anticipated progress, footwear, ice/ elevation, and compression socks. Equipment provided today:  Patient given handout with above exercises for home.   Recommendations/Intent for next treatment

## 2023-06-27 ENCOUNTER — HOSPITAL ENCOUNTER (OUTPATIENT)
Dept: PHYSICAL THERAPY | Age: 53
Setting detail: RECURRING SERIES
Discharge: HOME | End: 2023-06-30
Payer: COMMERCIAL

## 2023-06-27 PROCEDURE — 97110 THERAPEUTIC EXERCISES: CPT

## 2023-06-27 PROCEDURE — 97140 MANUAL THERAPY 1/> REGIONS: CPT

## 2023-06-29 ENCOUNTER — HOSPITAL ENCOUNTER (OUTPATIENT)
Dept: PHYSICAL THERAPY | Age: 53
Setting detail: RECURRING SERIES
End: 2023-06-29
Payer: COMMERCIAL

## 2023-06-29 PROCEDURE — 97140 MANUAL THERAPY 1/> REGIONS: CPT

## 2023-06-29 PROCEDURE — 97110 THERAPEUTIC EXERCISES: CPT

## 2023-07-03 ENCOUNTER — HOSPITAL ENCOUNTER (OUTPATIENT)
Dept: PHYSICAL THERAPY | Age: 53
Setting detail: RECURRING SERIES
Discharge: HOME | End: 2023-07-06
Payer: COMMERCIAL

## 2023-07-03 PROCEDURE — 97140 MANUAL THERAPY 1/> REGIONS: CPT

## 2023-07-03 PROCEDURE — 97110 THERAPEUTIC EXERCISES: CPT

## 2023-07-03 NOTE — PROGRESS NOTES
None today      HEP: As above; handouts given to patient for all exercises. Treatment/Session Summary:    >Treatment Assessment:  Very good performance today with exercises. Did not report any exacerbation of discomfort with calf raises on shuttle press. Balance did well as well on both level and unstable surfaces. Communication/Consultation:  None today  Equipment provided today:  Patient given handout with above exercises for home. Recommendations/Intent for next treatment session: Next visit will focus on pain and edema control, manual therapy and modalities as needed, progression of ROM, mobility, flexibility, and stability as tolerated. May benefit from addition of single leg stance, if able to tolerate.     >Total Treatment Billable Duration:  54 minutes  Time In: 0805  Time Out: 0900    Ingris Melgoza, PT       Charge Capture  }Post Session Pain  PT Visit Info  [facility] Portal  MD Guidelines  Scanned Media  Benefits  MyChart    Future Appointments   Date Time Provider [address]   7/3/2023  2:30 PM Darnell Hu OT [facility]   7/6/2023  3:30 PM Dipti Luca, PT [facility]   7/11/2023  3:30 PM Dipti Luca, PT [facility]   7/13/2023  3:30 PM Dipti Luca, PT [facility]   7/18/2023  3:30 PM Dipti Luca, PT [facility]   9/22/2023  9:40 AM NATHALY Jones - CNP [facility]

## 2023-07-06 ENCOUNTER — HOSPITAL ENCOUNTER (OUTPATIENT)
Dept: PHYSICAL THERAPY | Age: 53
Setting detail: RECURRING SERIES
Discharge: HOME | End: 2023-07-09
Payer: COMMERCIAL

## 2023-07-06 PROCEDURE — 97110 THERAPEUTIC EXERCISES: CPT

## 2023-07-06 PROCEDURE — 97140 MANUAL THERAPY 1/> REGIONS: CPT

## 2023-07-06 ASSESSMENT — PAIN SCALES - GENERAL: PAINLEVEL_OUTOF10: 3

## 2023-07-13 ENCOUNTER — HOSPITAL ENCOUNTER (OUTPATIENT)
Dept: PHYSICAL THERAPY | Age: 53
Setting detail: RECURRING SERIES
End: 2023-07-13
Payer: COMMERCIAL

## 2023-07-13 NOTE — PROGRESS NOTES
[address] [place] [address] 7/13/2023      Patient canceled appointment due to a death in the family and going out of town.        Patti Andrews, PT, DPT, OMT-C

## 2023-07-14 ENCOUNTER — HOSPITAL ENCOUNTER (OUTPATIENT)
Dept: PHYSICAL THERAPY | Age: 53
Setting detail: RECURRING SERIES
End: 2023-07-14
Payer: COMMERCIAL

## 2023-07-24 ENCOUNTER — HOSPITAL ENCOUNTER (OUTPATIENT)
Dept: PHYSICAL THERAPY | Age: 53
Setting detail: RECURRING SERIES
Discharge: HOME | End: 2023-07-27
Payer: COMMERCIAL

## 2023-07-24 PROCEDURE — 97140 MANUAL THERAPY 1/> REGIONS: CPT

## 2023-07-24 PROCEDURE — 97110 THERAPEUTIC EXERCISES: CPT

## 2023-07-24 ASSESSMENT — PAIN SCALES - GENERAL: PAINLEVEL_OUTOF10: 5

## 2023-07-24 NOTE — PROGRESS NOTES
Nico Rehman  : 1970  Primary: [employer]  Secondary:  [address] @ [address] [facility] [address]  Phone: [phone]  Fax: [phone] Plan Frequency: 2 times a week for 8 weeks  Plan of Care/Certification Expiration Date: 23 (start of plan of care 2023)      >PT Visit Info:  Plan Frequency: 2 times a week for 8 weeks  Plan of Care/Certification Expiration Date: 23 (start of plan of care 2023)      Visit Count:  6    OUTPATIENT PHYSICAL THERAPY:OP NOTE TYPE: Treatment Note 2023       Episode  }Appt Desk             Treatment Diagnosis:  Pain in left ankle and joints of left foot (M25.572)  Tibialis tendinitis of left lower extremity (E31.415)  Other abnormalities of gait and mobility (R26.89)  Chronic pain (G89.29)  Medical/Referring Diagnosis:  Tibialis tendinitis of left lower extremity [M76.822]  Chronic pain of left ankle [H97.163, G89.29]  Referring Physician:  Darcie Moreno, APRN - CNP  MD Orders:  PT Eval and Treat   Return MD Appt:  2023  Date of Onset:  Onset Date: 23 (s/p L ankle surgery- details below)     Allergies:   Sulfa antibiotics  Restrictions/Precautions:  Restrictions/Precautions: Weight Bearing  Left Lower Extremity Weight Bearing: Weight Bearing As Tolerated     Interventions Planned (Treatment may consist of any combination of the following):    Current Treatment Recommendations: Strengthening; ROM; Balance training; Functional mobility training; Transfer training; ADL/Self-care training; Endurance training; Gait training; Stair training; Neuromuscular re-education; Manual; Pain management; Return to work related activity; Home exercise program; Safety education & training; Modalities; Positioning; Dry needling; Therapeutic activities     >Subjective Comments:  L ankle/foot has been hurting more and she has been using crutches more.  Is not

## 2023-07-27 ENCOUNTER — HOSPITAL ENCOUNTER (OUTPATIENT)
Dept: PHYSICAL THERAPY | Age: 53
Setting detail: RECURRING SERIES
Discharge: HOME | End: 2023-07-30
Payer: COMMERCIAL

## 2023-07-27 PROCEDURE — 97140 MANUAL THERAPY 1/> REGIONS: CPT

## 2023-07-27 NOTE — PROGRESS NOTES
Sg Jacobsenurry  : 1970  Primary: [employer]  Secondary:  [address] @ [address] [facility] [address]  Phone: [phone]  Fax: [phone] Plan Frequency: 2 times a week for 8 weeks  Plan of Care/Certification Expiration Date: 23 (start of plan of care 2023)      >PT Visit Info:  Plan Frequency: 2 times a week for 8 weeks  Plan of Care/Certification Expiration Date: 23 (start of plan of care 2023)      Visit Count:  7    OUTPATIENT PHYSICAL THERAPY:OP NOTE TYPE: Treatment Note 2023       Episode  }Appt Desk             Treatment Diagnosis:  Pain in left ankle and joints of left foot (M25.572)  Tibialis tendinitis of left lower extremity (E19.221)  Other abnormalities of gait and mobility (R26.89)  Chronic pain (G89.29)  Medical/Referring Diagnosis:  Tibialis tendinitis of left lower extremity [M76.822]  Chronic pain of left ankle [G19.745, G89.29]  Referring Physician:  Daniella Kee, APRN - CNP  MD Orders:  PT Eval and Treat   Return MD Appt:  2023  Date of Onset:  Onset Date: 23 (s/p L ankle surgery- details below)     Allergies:   Sulfa antibiotics  Restrictions/Precautions:  Restrictions/Precautions: Weight Bearing  Left Lower Extremity Weight Bearing: Weight Bearing As Tolerated     Interventions Planned (Treatment may consist of any combination of the following):    Current Treatment Recommendations: Strengthening; ROM; Balance training; Functional mobility training; Transfer training; ADL/Self-care training; Endurance training; Gait training; Stair training; Neuromuscular re-education; Manual; Pain management; Return to work related activity; Home exercise program; Safety education & training; Modalities; Positioning; Dry needling; Therapeutic activities     >Subjective Comments:  Continues to have more pain and swelling the past few days.  Is not sure what caused

## 2023-07-29 ASSESSMENT — PAIN SCALES - GENERAL: PAINLEVEL_OUTOF10: 5

## 2023-07-31 ENCOUNTER — HOSPITAL ENCOUNTER (OUTPATIENT)
Dept: PHYSICAL THERAPY | Age: 53
Setting detail: RECURRING SERIES
Discharge: HOME | End: 2023-08-03
Payer: COMMERCIAL

## 2023-07-31 PROCEDURE — 97110 THERAPEUTIC EXERCISES: CPT

## 2023-07-31 PROCEDURE — 97140 MANUAL THERAPY 1/> REGIONS: CPT

## 2023-07-31 ASSESSMENT — PAIN SCALES - GENERAL
PAINLEVEL_OUTOF10: 2
PAINLEVEL_OUTOF10: 4

## 2023-07-31 NOTE — THERAPY RECERTIFICATION
Yecenia Osorio  : 1970  Primary: [employer]  Secondary:  [address] @ [address] [facility] [address]  Phone: [phone]  Fax: [phone] Plan Frequency: 2 times a week for 8 weeks    Plan of Care/Certification Expiration Date: 23 (start of plan of care 2023)      PT Visit Info:  Plan Frequency: 2 times a week for 8 weeks  Plan of Care/Certification Expiration Date: 23 (start of plan of care 2023)      Visit Count:  8                OUTPATIENT PHYSICAL THERAPY:             OP NOTE TYPE: Recertification                Episode (L ankle) Appt Desk         Treatment Diagnosis:    Pain in left ankle and joints of left foot (M25.572)  Tibialis tendinitis of left lower extremity (Q25.889)  Other abnormalities of gait and mobility (R26.89)  Chronic pain (G89.29)  Medical/Referring Diagnosis:  Tibialis tendinitis of left lower extremity [M76.822]  Chronic pain of left ankle [B49.963, G89.29]  Referring Physician:  Modesta Moseley, APRN - CNP  MD Orders:  PT Eval and Treat   Return MD Appt:  2023  Date of Onset:  Onset Date: 23 (s/p L ankle surgery- details below)    s/p Left gastrocnemius recession, medial displacement calcaneal osteotomy, posterior tibial tendon debridement with flexor digitorum longus tendon transfer, spring ligament reconstruction of the ankle, and cotton medial cuneiform opening wedge osteotomy  Allergies:  Sulfa antibiotics  Restrictions/Precautions:    Restrictions/Precautions: Weight Bearing  Left Lower Extremity Weight Bearing: Weight Bearing As Tolerated       Medications Last Reviewed:  2023      OBJECTIVE     Patient denies any LE paresthesia. Patient denies any increase of symptoms with cough, sneeze or valsalva. Patient denies any saddle paresthesia or bowel/bladder deficits.      Observation/Orthostatic Postural Assessment:

## 2023-07-31 NOTE — PROGRESS NOTES
last 2 weeks with increased swelling. John Roberto to be able to get two shoes on.  >Initial:     2/10 >Post Session:      1/10  Medications Last Reviewed:  7/31/2023  Updated Objective Findings:  See Re-Certification Note from today  Treatment   THERAPEUTIC EXERCISE: (30 minutes):   Exercises per grid below to improve mobility, strength, balance, and coordination. Required moderate visual, verbal, manual, and tactile cues to promote proper body alignment, promote proper body posture, and promote proper body mechanics. Progressed resistance, range, repetitions, and complexity of movement as indicated. Date  7/31/2023 Date:  7/6/2023 Date  7/24/23   Activity/Exercise  Parameters    PROM All directions, 10 minutes All directions, 5 minutes DF, PF, inversion and eversion x 5 minutes   Ankle pumps X 20 2 x 20    Ankle circles X 10 each 2 x 10 each    Ankle ABCs --- X 1    Calf stretch Standing, 3 x 30 seconds Slant board, 4 x 30 seconds  Slantboard  3 x 20\" B   Hamstring stretch --- --- 3 x 30\" L in supine   Ankle 4-way Green, x 15 each --- Yellow  3 x 12 ea   BAPS board --- Standing, Level 2    circles clockwise and counterclockwise, x 10 each     PF/DF 2 x 10    Calf raises Standing 2 x 10 Standing, 2 x 10    Step ups --- 4\" 3 x 10 L    Clamshells --- ---    Leg press --- ---    Dynamic balance Single leg stance, 5 x 20 seconds Single leg stance, 4 x 15 seconds    Rocker board, plantarflexion/ dorsiflexion 2 x 10  Balance: 2 x 20 seconds    Tandem stance, 2 x 30 second each    Bridging --- ---    Standing hip abduction --- 2 x 10 each      Time spent with patient reviewing proper muscle recruitment and technique with exercises. MANUAL THERAPY: (23 minutes):   Joint mobilization, Soft tissue mobilization, and Manipulation was utilized and necessary because of the patient's restricted joint motion, painful spasm, loss of articular motion, and restricted motion of soft tissue.    Soft tissue mobilizations to L

## 2023-08-11 ENCOUNTER — HOSPITAL ENCOUNTER (OUTPATIENT)
Dept: PHYSICAL THERAPY | Age: 53
Setting detail: RECURRING SERIES
End: 2023-08-11
Payer: COMMERCIAL

## 2023-08-14 ENCOUNTER — HOSPITAL ENCOUNTER (OUTPATIENT)
Dept: PHYSICAL THERAPY | Age: 53
Setting detail: RECURRING SERIES
Discharge: HOME | End: 2023-08-17
Payer: COMMERCIAL

## 2023-08-14 PROCEDURE — 97110 THERAPEUTIC EXERCISES: CPT

## 2023-08-14 ASSESSMENT — PAIN SCALES - GENERAL: PAINLEVEL_OUTOF10: 4

## 2023-08-14 NOTE — PROGRESS NOTES
Hydroxychloroquine Pregnancy And Lactation Text: This medication has been shown to cause fetal harm but it isn't assigned a Pregnancy Risk Category. There are small amounts excreted in breast milk. Yunier Berry  : 1970  Primary: [employer]  Secondary:  [address] @ [address] [facility] [address]  Phone: [phone]  Fax: [phone] Plan Frequency: 2 times a week for 8 weeks  Plan of Care/Certification Expiration Date: 23 (start of plan of care 2023)      >PT Visit Info:  Plan Frequency: 2 times a week for 8 weeks  Plan of Care/Certification Expiration Date: 23 (start of plan of care 2023)      Visit Count:  9    OUTPATIENT PHYSICAL THERAPY:OP NOTE TYPE: Treatment Note 2023       Episode  }Appt Desk             Treatment Diagnosis:  Pain in left ankle and joints of left foot (M25.572)  Tibialis tendinitis of left lower extremity (A66.517)  Other abnormalities of gait and mobility (R26.89)  Chronic pain (G89.29)  Medical/Referring Diagnosis:  Tibialis tendinitis of left lower extremity [M76.822]  Chronic pain of left ankle [P47.143, G89.29]  Referring Physician:  Miroslava Burns, APRN - CNP  MD Orders:  PT Eval and Treat   Return MD Appt:  2023  Date of Onset:  Onset Date: 23 (s/p L ankle surgery- details below)     Allergies:   Sulfa antibiotics  Restrictions/Precautions:  Restrictions/Precautions: Weight Bearing  Left Lower Extremity Weight Bearing: Weight Bearing As Tolerated     Interventions Planned (Treatment may consist of any combination of the following):    Current Treatment Recommendations: Strengthening; ROM; Balance training; Functional mobility training; Transfer training; ADL/Self-care training; Endurance training; Gait training; Stair training; Neuromuscular re-education; Manual; Pain management; Return to work related activity; Home exercise program; Safety education & training; Modalities; Positioning; Dry needling;  Therapeutic activities     >Subjective Comments:   Patient reports that her L ankle still hurts sometimes and has been wearing

## 2023-08-16 ENCOUNTER — HOSPITAL ENCOUNTER (OUTPATIENT)
Dept: PHYSICAL THERAPY | Age: 53
Setting detail: RECURRING SERIES
Discharge: HOME | End: 2023-08-19
Payer: COMMERCIAL

## 2023-08-16 PROCEDURE — 97110 THERAPEUTIC EXERCISES: CPT

## 2023-08-16 PROCEDURE — 97140 MANUAL THERAPY 1/> REGIONS: CPT

## 2023-08-16 NOTE — PROGRESS NOTES
Diane Mcdaniels  : 1970  Primary: [employer]  Secondary:  [address] @ [address] [facility] [address]  Phone: [phone]  Fax: [phone] Plan Frequency: 2 times a week for 8 weeks  Plan of Care/Certification Expiration Date: 23 (start of plan of care 2023)      >PT Visit Info:  Plan Frequency: 2 times a week for 8 weeks  Plan of Care/Certification Expiration Date: 23 (start of plan of care 2023)      Visit Count:  10    OUTPATIENT PHYSICAL THERAPY:OP NOTE TYPE: Treatment Note 2023       Episode  }Appt Desk             Treatment Diagnosis:  Pain in left ankle and joints of left foot (M25.572)  Tibialis tendinitis of left lower extremity (O72.536)  Other abnormalities of gait and mobility (R26.89)  Chronic pain (G89.29)  Medical/Referring Diagnosis:  Tibialis tendinitis of left lower extremity [M76.822]  Chronic pain of left ankle [O87.246, G89.29]  Referring Physician:  Dorys Cyr, APRN - CNP  MD Orders:  PT Eval and Treat   Return MD Appt:  2023  Date of Onset:  Onset Date: 23 (s/p L ankle surgery- details below)     Allergies:   Sulfa antibiotics  Restrictions/Precautions:  Restrictions/Precautions: Weight Bearing  Left Lower Extremity Weight Bearing: Weight Bearing As Tolerated     Interventions Planned (Treatment may consist of any combination of the following):    Current Treatment Recommendations: Strengthening; ROM; Balance training; Functional mobility training; Transfer training; ADL/Self-care training; Endurance training; Gait training; Stair training; Neuromuscular re-education; Manual; Pain management; Return to work related activity; Home exercise program; Safety education & training; Modalities; Positioning; Dry needling; Therapeutic activities     >Subjective Comments:   Reports that her pain is about the same. Has some stiffness today.   >Initial:

## 2023-08-22 ENCOUNTER — HOSPITAL ENCOUNTER (OUTPATIENT)
Dept: PHYSICAL THERAPY | Age: 53
Setting detail: RECURRING SERIES
Discharge: HOME | End: 2023-08-25
Payer: COMMERCIAL

## 2023-08-22 PROCEDURE — 97140 MANUAL THERAPY 1/> REGIONS: CPT

## 2023-08-22 PROCEDURE — 97110 THERAPEUTIC EXERCISES: CPT

## 2023-08-22 ASSESSMENT — PAIN SCALES - GENERAL: PAINLEVEL_OUTOF10: 6

## 2023-08-22 NOTE — PROGRESS NOTES
Heather Villaseñor  : 1970  Primary: [employer]  Secondary:  [address] @ [address] [facility] [address]  Phone: [phone]  Fax: [phone] Plan Frequency: 2 times a week for 8 weeks  Plan of Care/Certification Expiration Date: 23 (start of plan of care 2023)      >PT Visit Info:  Plan Frequency: 2 times a week for 8 weeks  Plan of Care/Certification Expiration Date: 23 (start of plan of care 2023)      Visit Count:  11    OUTPATIENT PHYSICAL THERAPY:OP NOTE TYPE: Treatment Note 2023       Episode  }Appt Desk             Treatment Diagnosis:  Pain in left ankle and joints of left foot (M25.572)  Tibialis tendinitis of left lower extremity (Q52.080)  Other abnormalities of gait and mobility (R26.89)  Chronic pain (G89.29)  Medical/Referring Diagnosis:  Tibialis tendinitis of left lower extremity [M76.822]  Chronic pain of left ankle [V64.069, G89.29]  Referring Physician:  Jonah Asher APRN - CNP  MD Orders:  PT Eval and Treat   Return MD Appt:  2023  Date of Onset:  Onset Date: 23 (s/p L ankle surgery- details below)     Allergies:   Sulfa antibiotics  Restrictions/Precautions:  Restrictions/Precautions: Weight Bearing  Left Lower Extremity Weight Bearing: Weight Bearing As Tolerated     Interventions Planned (Treatment may consist of any combination of the following):    Current Treatment Recommendations: Strengthening; ROM; Balance training; Functional mobility training; Transfer training; ADL/Self-care training; Endurance training; Gait training; Stair training; Neuromuscular re-education; Manual; Pain management; Return to work related activity; Home exercise program; Safety education & training; Modalities; Positioning; Dry needling; Therapeutic activities     >Subjective Comments:   Pain and swelling come and go.  Has been having more discomfort  >Initial:

## 2023-08-24 ENCOUNTER — HOSPITAL ENCOUNTER (OUTPATIENT)
Dept: PHYSICAL THERAPY | Age: 53
Setting detail: RECURRING SERIES
Discharge: HOME | End: 2023-08-27
Payer: COMMERCIAL

## 2023-08-24 PROCEDURE — 97140 MANUAL THERAPY 1/> REGIONS: CPT

## 2023-08-24 PROCEDURE — 97110 THERAPEUTIC EXERCISES: CPT

## 2023-08-24 ASSESSMENT — PAIN SCALES - GENERAL: PAINLEVEL_OUTOF10: 4

## 2023-08-24 NOTE — PROGRESS NOTES
March Ghent  : 1970  Primary: [employer]  Secondary:  [address] @ [address] [address]  Phone: [phone]  Fax: [phone] Plan Frequency: 2 times a week for 8 weeks  Plan of Care/Certification Expiration Date: 23 (start of plan of care 2023)      >PT Visit Info:  Plan Frequency: 2 times a week for 8 weeks  Plan of Care/Certification Expiration Date: 23 (start of plan of care 2023)      Visit Count:  12    OUTPATIENT PHYSICAL THERAPY:OP NOTE TYPE: Treatment Note 2023       Episode  }Appt Desk             Treatment Diagnosis:  Pain in left ankle and joints of left foot (M25.572)  Tibialis tendinitis of left lower extremity (L28.066)  Other abnormalities of gait and mobility (R26.89)  Chronic pain (G89.29)  Medical/Referring Diagnosis:  Tibialis tendinitis of left lower extremity [M76.822]  Chronic pain of left ankle [J99.611, G89.29]  Referring Physician:  Deonte Patino, APRN - CNP  MD Orders:  PT Eval and Treat   Return MD Appt:  2023  Date of Onset:  Onset Date: 23 (s/p L ankle surgery- details below)     Allergies:   Sulfa antibiotics  Restrictions/Precautions:  Restrictions/Precautions: Weight Bearing  Left Lower Extremity Weight Bearing: Weight Bearing As Tolerated     Interventions Planned (Treatment may consist of any combination of the following):    Current Treatment Recommendations: Strengthening; ROM; Balance training; Functional mobility training; Transfer training; ADL/Self-care training; Endurance training; Gait training; Stair training; Neuromuscular re-education; Manual; Pain management; Return to work related activity; Home exercise program; Safety education & training; Modalities; Positioning; Dry needling; Therapeutic activities     >Subjective Comments:   Patient states today is a good day with her ankle.  Feels like she is improving

## 2023-08-30 ENCOUNTER — HOSPITAL ENCOUNTER (OUTPATIENT)
Dept: PHYSICAL THERAPY | Age: 53
Setting detail: RECURRING SERIES
End: 2023-08-30
Payer: COMMERCIAL

## 2023-08-31 ENCOUNTER — HOSPITAL ENCOUNTER (OUTPATIENT)
Dept: PHYSICAL THERAPY | Age: 53
Setting detail: RECURRING SERIES
End: 2023-08-31
Payer: COMMERCIAL

## 2023-08-31 PROCEDURE — 97140 MANUAL THERAPY 1/> REGIONS: CPT

## 2023-08-31 PROCEDURE — 97110 THERAPEUTIC EXERCISES: CPT

## 2023-08-31 ASSESSMENT — PAIN SCALES - GENERAL: PAINLEVEL_OUTOF10: 4

## 2023-08-31 NOTE — PROGRESS NOTES
Kevin Santiago  : 1970  Primary: [employer]  Secondary:  [address] @ [facility] [address]  Phone: [phone]  Fax: [phone] Plan Frequency: 2 times a week for 8 weeks  Plan of Care/Certification Expiration Date: 23 (start of plan of care 2023)      >PT Visit Info:  Plan Frequency: 2 times a week for 8 weeks  Plan of Care/Certification Expiration Date: 23 (start of plan of care 2023)      Visit Count:  13    OUTPATIENT PHYSICAL THERAPY:OP NOTE TYPE: Treatment Note 2023       Episode  }Appt Desk             Treatment Diagnosis:  Pain in left ankle and joints of left foot (M25.572)  Tibialis tendinitis of left lower extremity (A68.187)  Other abnormalities of gait and mobility (R26.89)  Chronic pain (G89.29)  Medical/Referring Diagnosis:  Tibialis tendinitis of left lower extremity [M76.822]  Chronic pain of left ankle [R79.927, G89.29]  Referring Physician:  Marcelo Fiore, APRN - CNP  MD Orders:  PT Eval and Treat   Return MD Appt:  2023  Date of Onset:  Onset Date: 23 (s/p L ankle surgery- details below)     Allergies:   Sulfa antibiotics  Restrictions/Precautions:  Restrictions/Precautions: Weight Bearing  Left Lower Extremity Weight Bearing: Weight Bearing As Tolerated     Interventions Planned (Treatment may consist of any combination of the following):    Current Treatment Recommendations: Strengthening; ROM; Balance training; Functional mobility training; Transfer training; ADL/Self-care training; Endurance training; Gait training; Stair training; Neuromuscular re-education; Manual; Pain management; Return to work related activity; Home exercise program; Safety education & training; Modalities; Positioning; Dry needling; Therapeutic activities     >Subjective Comments:   Reports that she is feeling better today.  Yesterday was not as good of a day, but

## 2023-09-05 ENCOUNTER — HOSPITAL ENCOUNTER (OUTPATIENT)
Dept: PHYSICAL THERAPY | Age: 53
Setting detail: RECURRING SERIES
End: 2023-09-05
Payer: COMMERCIAL

## 2023-09-05 NOTE — PROGRESS NOTES
[address] at [facility] 9/5/2023      Patient cancelled all week due to having COVID. Will get re-scheduled once feeling better.        Marquise Kaur, PT, DPT, OMT-C

## 2023-09-08 ENCOUNTER — APPOINTMENT (OUTPATIENT)
Dept: PHYSICAL THERAPY | Age: 53
End: 2023-09-08
Payer: COMMERCIAL

## 2023-09-12 ENCOUNTER — HOSPITAL ENCOUNTER (OUTPATIENT)
Dept: PHYSICAL THERAPY | Age: 53
Setting detail: RECURRING SERIES
Discharge: HOME | End: 2023-09-15
Payer: COMMERCIAL

## 2023-09-12 PROCEDURE — 97140 MANUAL THERAPY 1/> REGIONS: CPT

## 2023-09-12 PROCEDURE — 97110 THERAPEUTIC EXERCISES: CPT

## 2023-09-12 ASSESSMENT — PAIN SCALES - GENERAL: PAINLEVEL_OUTOF10: 4

## 2023-09-12 NOTE — PROGRESS NOTES
Drake Fox  : 1970  Primary: [employer]  Secondary:  [address] @ [address] [facility] [address]  Phone: [phone]  Fax: [phone] Plan Frequency: 2 times a week for 8 weeks  Plan of Care/Certification Expiration Date: 23 (start of plan of care 2023)      >PT Visit Info:  Plan Frequency: 2 times a week for 8 weeks  Plan of Care/Certification Expiration Date: 23 (start of plan of care 2023)      Visit Count:  14    OUTPATIENT PHYSICAL THERAPY:OP NOTE TYPE: Treatment Note 2023       Episode  }Appt Desk             Treatment Diagnosis:  Pain in left ankle and joints of left foot (M25.572)  Tibialis tendinitis of left lower extremity (C03.288)  Other abnormalities of gait and mobility (R26.89)  Chronic pain (G89.29)  Medical/Referring Diagnosis:  Tibialis tendinitis of left lower extremity [M76.822]  Chronic pain of left ankle [M92.012, G89.29]  Referring Physician:  Abdulaziz Marr, APRN - CNP  MD Orders:  PT Eval and Treat   Return MD Appt:  2023  Date of Onset:  Onset Date: 23 (s/p L ankle surgery- details below)     Allergies:   Sulfa antibiotics  Restrictions/Precautions:  Restrictions/Precautions: Weight Bearing  Left Lower Extremity Weight Bearing: Weight Bearing As Tolerated     Interventions Planned (Treatment may consist of any combination of the following):    Current Treatment Recommendations: Strengthening; ROM; Balance training; Functional mobility training; Transfer training; ADL/Self-care training; Endurance training; Gait training; Stair training; Neuromuscular re-education; Manual; Pain management; Return to work related activity; Home exercise program; Safety education & training; Modalities; Positioning; Dry needling; Therapeutic activities     >Subjective Comments:   Was out of work with [facility] [employer] last week.  Saturday and  was on crutches due to

## 2023-09-14 ENCOUNTER — HOSPITAL ENCOUNTER (OUTPATIENT)
Dept: PHYSICAL THERAPY | Age: 53
Setting detail: RECURRING SERIES
End: 2023-09-14
Payer: COMMERCIAL

## 2023-09-14 NOTE — PROGRESS NOTES
[address] [place] [address] 9/14/2023      Patient canceled appointment this afternoon due to having another appointment.        Mauri Almazan, PT, DPT, OMT-C

## 2023-09-17 NOTE — PROGRESS NOTES
[facility] at [facility] 9/18/2023      Patient canceled today's appointment and asked to be placed on hold. Patient needing biopsy performed.        Kortney Loo, PT, DPT, OMT-C

## 2023-09-18 ENCOUNTER — HOSPITAL ENCOUNTER (OUTPATIENT)
Dept: PHYSICAL THERAPY | Age: 53
Setting detail: RECURRING SERIES
End: 2023-09-18
Payer: COMMERCIAL

## 2023-09-21 ENCOUNTER — APPOINTMENT (OUTPATIENT)
Dept: PHYSICAL THERAPY | Age: 53
End: 2023-09-21
Payer: COMMERCIAL

## 2023-09-21 ENCOUNTER — TELEPHONE (OUTPATIENT)
Dept: ORTHOPEDIC SURGERY | Age: 53
End: 2023-09-21

## 2023-09-26 ENCOUNTER — APPOINTMENT (OUTPATIENT)
Dept: PHYSICAL THERAPY | Age: 53
End: 2023-09-26
Payer: COMMERCIAL

## 2023-09-27 ENCOUNTER — APPOINTMENT (OUTPATIENT)
Dept: PHYSICAL THERAPY | Age: 53
End: 2023-09-27
Payer: COMMERCIAL

## 2023-10-04 ENCOUNTER — OFFICE VISIT (OUTPATIENT)
Dept: ORTHOPEDIC SURGERY | Age: 53
End: 2023-10-04
Payer: COMMERCIAL

## 2023-10-04 DIAGNOSIS — M76.822 TIBIALIS TENDINITIS OF LEFT LOWER EXTREMITY: Primary | ICD-10-CM

## 2023-10-04 PROCEDURE — 99214 OFFICE O/P EST MOD 30 MIN: CPT | Performed by: NURSE PRACTITIONER

## 2023-10-04 NOTE — PROGRESS NOTES
Name: Miko Lama  YOB: 1970  Gender: female  MRN: [id number]    Procedure Performed:  Left gastrocnemius recession  Left medial displacement calcaneal osteotomy  Left posterior tibial tendon debridement with flexor digitorum longus tendon transfer  Left spring ligament reconstruction of the ankle  Left cotton medial cuneiform opening wedge osteotomy           Date of Procedure: 04/07/2023      Subjective: Patient notes that she did receive some benefit from physical therapy. She had to stop going due to interference with her job. That she cannot fit into regular shoes still at this time. She also has back of the heel pain regularly. Physical Examination: All incisional areas look great and are well-healed. She does have the appearance of an arch in her foot. She does have noted swelling especially to the lateral heel and hindfoot area. She has pain with palpation over this area as well. She has palpable pulses and intact sensation to the foot. Imaging:   I independently interpreted XR taken today  Left foot XR: AP, Lateral, Oblique views     ICD-10-CM    1. Tibialis tendinitis of left lower extremity  M76.822 XR FOOT LEFT (MIN 3 VIEWS)         Report: AP, lateral, oblique x-ray of the left foot demonstrates appearance of healed osteotomies with broken calcaneal screw    Impression: Appearance of healed osteotomies with broken calcaneal screw   [facility], APRN - CNP           Assessment:   Status post left flatfoot reconstruction. We did discuss possibility of having the hardware removed today. I will call the patient with the CT results and we can then discuss further care at this time which could possibly include a revision of the calcaneal osteotomy, which we did not discuss today. Possible surgical options at this time would be calcaneal hardware removal versus calcaneal osteotomy revision. We will await the CT results to see.        Plan:   3 This is

## 2023-10-06 ENCOUNTER — HOSPITAL ENCOUNTER (OUTPATIENT)
Dept: CT IMAGING | Age: 53
Discharge: HOME | End: 2023-10-06
Payer: COMMERCIAL

## 2023-10-06 DIAGNOSIS — M76.822 TIBIALIS TENDINITIS OF LEFT LOWER EXTREMITY: ICD-10-CM

## 2023-10-06 PROCEDURE — 73700 CT LOWER EXTREMITY W/O DYE: CPT

## 2023-10-09 ENCOUNTER — TELEPHONE (OUTPATIENT)
Dept: ORTHOPEDIC SURGERY | Age: 53
End: 2023-10-09

## 2023-10-09 NOTE — TELEPHONE ENCOUNTER
I have reviewed the patient's CT scan images of the left ankle. They show a broken hardware with nonunion of her calcaneal osteotomy. Maybe there is about 10% is healed but I really do not think is very much and she has persistent pain and swelling in this area. Of note the wedge in the medial cuneiform is intact and healed well. I called on the telephone today and discussed the findings. We discussed the low chance of a bone stimulator working for this in isolation. We discussed a revision of the calcaneal osteotomy including use of bone graft and probably staples or other screws afterwards as well as immediately 6 weeks nonweightbearing. I did tell her I like her to be in a walker boot for 2 weeks postoperatively so she can start of the bone stimulator. I think she will need a bone stimulator postoperatively to treat this at risk revision arthrodesis or fusion as a matter medical medical necessity. She will call us back when or if she like to proceed or has more questions.       Surgery-revision left calcaneal osteotomy with distal tibial bone marrow aspirate                 Arthrex screwdriver, Arthrex 6.7 millimeter screws and large staples, crushed cancellous graft and Arthrex bone marrow aspirate

## 2023-10-16 DIAGNOSIS — M76.822 TIBIALIS TENDINITIS OF LEFT LOWER EXTREMITY: ICD-10-CM

## 2023-10-16 DIAGNOSIS — M96.0 PSEUDARTHROSIS AFTER FUSION OR ARTHRODESIS: Primary | ICD-10-CM

## 2023-10-16 DIAGNOSIS — G89.29 CHRONIC PAIN OF LEFT ANKLE: ICD-10-CM

## 2023-10-16 DIAGNOSIS — M25.572 CHRONIC PAIN OF LEFT ANKLE: ICD-10-CM

## 2023-10-16 DIAGNOSIS — M96.89 NONUNION OF BONE AFTER OSTEOTOMY: ICD-10-CM

## 2023-10-17 ENCOUNTER — TELEPHONE (OUTPATIENT)
Dept: ORTHOPEDIC SURGERY | Age: 53
End: 2023-10-17

## 2023-10-18 ENCOUNTER — TELEPHONE (OUTPATIENT)
Dept: ORTHOPEDIC SURGERY | Age: 53
End: 2023-10-18

## 2023-10-19 DIAGNOSIS — M76.822 TIBIALIS TENDINITIS OF LEFT LOWER EXTREMITY: Primary | ICD-10-CM

## 2023-10-19 DIAGNOSIS — M76.822 POSTERIOR TIBIAL TENDONITIS, LEFT: ICD-10-CM

## 2023-10-19 DIAGNOSIS — M96.89 NONUNION OF BONE AFTER OSTEOTOMY: ICD-10-CM

## 2023-10-26 RX ORDER — ALBUTEROL SULFATE 90 UG/1
1 AEROSOL, METERED RESPIRATORY (INHALATION) EVERY 6 HOURS PRN
COMMUNITY
Start: 2023-08-04 | End: 2023-10-26

## 2023-10-26 RX ORDER — METRONIDAZOLE 10 MG/G
GEL TOPICAL DAILY PRN
COMMUNITY
Start: 2023-10-09

## 2023-10-26 RX ORDER — NITROFURANTOIN 25; 75 MG/1; MG/1
100 CAPSULE ORAL 2 TIMES DAILY
COMMUNITY
Start: 2023-10-09 | End: 2023-10-26 | Stop reason: ALTCHOICE

## 2023-10-26 RX ORDER — ACETAMINOPHEN 500 MG
1000 TABLET ORAL 2 TIMES DAILY
COMMUNITY

## 2023-10-26 RX ORDER — BUDESONIDE AND FORMOTEROL FUMARATE DIHYDRATE 160; 4.5 UG/1; UG/1
2 AEROSOL RESPIRATORY (INHALATION) 2 TIMES DAILY
COMMUNITY
Start: 2023-09-20

## 2023-10-26 NOTE — PERIOP NOTE
Phone pre-assessment completed. Verified name&  . Order to obtain consent not found in EHR, however patient verifies case posting. Type 1B surgery,  assessment complete. Orders not received. Labs per surgeon: none  Labs per anesthesia protocol: none      Patient answered medical/surgical history questions at their best of ability. All prior to admission medications documented in EPIC. Patient instructed to take ONLY the following medications the day of surgery according to anesthesia guidelines with a small sip of water: use Symbicort inhaler, bring inhalers DOS. If you have never been diagnosed with liver disease, take Acetaminophen 1000mg in the morning and then again before bed one day prior to surgery date. Prescription meds to hold:Ozempic (wegovy)- last dose 10/20/23- Pt verbalizes understanding of clear liquid diet day prior to surgery. A clear liquid diet is a specific dietary plan that only includes liquids that are fully transparent at room temperature. Some items that may be allowed include water, ice, fruit juices without pulp, sports drinks, carbonated drinks, gelatin, tea, coffee, clear broths, and clear ice pops. VERBALIZES UNDERSTANDING TO HOLD ALL VITAMINS AND SUPPLEMENTS 7 DAYS PRIOR TO SURGERY DATE (with exception to Renal Vitamins) and NSAIDS (aspirin, naproxen, ibuprofen) 5 DAYS PRIOR TO SURGERY DATE PER ANESTHESIA PROTOCOL.     Instructed on the following:    > Arrive at [address] [place] [address] ([address])[place] [address]   >Time of arrival to be called the day before by 1700  > NPO after midnight including gum, mints, and ice chips  > Responsible adult must drive patient to the hospital, stay during surgery, and patient will need supervision 24 hours after anesthesia  > Use antibacterial soap in shower the night before surgery and on the morning of surgery  > All piercings must be removed prior to arrival.    > Leave all valuables (money and jewelry) at home but

## 2023-10-30 ENCOUNTER — ANESTHESIA EVENT (OUTPATIENT)
Dept: SURGERY | Age: 53
End: 2023-10-30
Payer: COMMERCIAL

## 2023-10-30 NOTE — PERIOP NOTE
Preop department called to notify patient of arrival time for scheduled procedure. Instructions given to   - Arrive at [address] [facility]. - Remain NPO after midnight, unless otherwise indicated, including gum, mints, and ice chips. - Have a responsible adult to drive patient to the hospital, stay during surgery, and patient will need supervision 24 hours after anesthesia. - Use antibacterial soap in shower the night before surgery and on the morning of surgery.        Was patient contacted: yes  Voicemail left:   Numbers contacted: [phone]   Arrival time: 4789

## 2023-10-31 ENCOUNTER — HOSPITAL ENCOUNTER (OUTPATIENT)
Age: 53
Setting detail: OUTPATIENT SURGERY
Discharge: HOME | End: 2023-10-31
Attending: ORTHOPAEDIC SURGERY | Admitting: ORTHOPAEDIC SURGERY
Payer: COMMERCIAL

## 2023-10-31 ENCOUNTER — APPOINTMENT (OUTPATIENT)
Dept: GENERAL RADIOLOGY | Age: 53
End: 2023-10-31
Attending: ORTHOPAEDIC SURGERY
Payer: COMMERCIAL

## 2023-10-31 ENCOUNTER — ANESTHESIA (OUTPATIENT)
Dept: SURGERY | Age: 53
End: 2023-10-31
Payer: COMMERCIAL

## 2023-10-31 VITALS
RESPIRATION RATE: 15 BRPM | TEMPERATURE: 97 F | HEIGHT: 64 IN | SYSTOLIC BLOOD PRESSURE: 145 MMHG | BODY MASS INDEX: 48.49 KG/M2 | WEIGHT: 284 LBS | DIASTOLIC BLOOD PRESSURE: 64 MMHG | OXYGEN SATURATION: 97 % | HEART RATE: 70 BPM

## 2023-10-31 DIAGNOSIS — G89.18 ACUTE POST-OPERATIVE PAIN: Primary | ICD-10-CM

## 2023-10-31 PROCEDURE — 3600000004 HC SURGERY LEVEL 4 BASE: Performed by: ORTHOPAEDIC SURGERY

## 2023-10-31 PROCEDURE — 2720000010 HC SURG SUPPLY STERILE: Performed by: ORTHOPAEDIC SURGERY

## 2023-10-31 PROCEDURE — C1889 IMPLANT/INSERT DEVICE, NOC: HCPCS | Performed by: ORTHOPAEDIC SURGERY

## 2023-10-31 PROCEDURE — 2580000003 HC RX 258: Performed by: ANESTHESIOLOGY

## 2023-10-31 PROCEDURE — 6360000002 HC RX W HCPCS: Performed by: NURSE PRACTITIONER

## 2023-10-31 PROCEDURE — C1713 ANCHOR/SCREW BN/BN,TIS/BN: HCPCS | Performed by: ORTHOPAEDIC SURGERY

## 2023-10-31 PROCEDURE — 6360000002 HC RX W HCPCS: Performed by: ANESTHESIOLOGY

## 2023-10-31 PROCEDURE — 64445 NJX AA&/STRD SCIATIC NRV IMG: CPT | Performed by: ANESTHESIOLOGY

## 2023-10-31 PROCEDURE — 2500000003 HC RX 250 WO HCPCS: Performed by: REGISTERED NURSE

## 2023-10-31 PROCEDURE — 3700000000 HC ANESTHESIA ATTENDED CARE: Performed by: ORTHOPAEDIC SURGERY

## 2023-10-31 PROCEDURE — 3700000001 HC ADD 15 MINUTES (ANESTHESIA): Performed by: ORTHOPAEDIC SURGERY

## 2023-10-31 PROCEDURE — 2580000003 HC RX 258: Performed by: NURSE PRACTITIONER

## 2023-10-31 PROCEDURE — 7100000010 HC PHASE II RECOVERY - FIRST 15 MIN: Performed by: ORTHOPAEDIC SURGERY

## 2023-10-31 PROCEDURE — 7100000000 HC PACU RECOVERY - FIRST 15 MIN: Performed by: ORTHOPAEDIC SURGERY

## 2023-10-31 PROCEDURE — 64447 NJX AA&/STRD FEMORAL NRV IMG: CPT | Performed by: ANESTHESIOLOGY

## 2023-10-31 PROCEDURE — 6360000002 HC RX W HCPCS: Performed by: REGISTERED NURSE

## 2023-10-31 PROCEDURE — 7100000001 HC PACU RECOVERY - ADDTL 15 MIN: Performed by: ORTHOPAEDIC SURGERY

## 2023-10-31 PROCEDURE — 3600000014 HC SURGERY LEVEL 4 ADDTL 15MIN: Performed by: ORTHOPAEDIC SURGERY

## 2023-10-31 PROCEDURE — 2709999900 HC NON-CHARGEABLE SUPPLY: Performed by: ORTHOPAEDIC SURGERY

## 2023-10-31 DEVICE — STAPLE BONE FIX W20XL20MM NIT COMPR W/ INSTRMT LO PROF FOR: Type: IMPLANTABLE DEVICE | Site: FOOT | Status: FUNCTIONAL

## 2023-10-31 DEVICE — ALLOGRAFT BNE CHIP 1-4 MM 5 CC CRUSH CANC: Type: IMPLANTABLE DEVICE | Site: FOOT | Status: FUNCTIONAL

## 2023-10-31 RX ORDER — SODIUM CHLORIDE 9 MG/ML
INJECTION, SOLUTION INTRAVENOUS PRN
Status: DISCONTINUED | OUTPATIENT
Start: 2023-10-31 | End: 2023-10-31 | Stop reason: HOSPADM

## 2023-10-31 RX ORDER — PROCHLORPERAZINE EDISYLATE 5 MG/ML
5 INJECTION INTRAMUSCULAR; INTRAVENOUS
Status: DISCONTINUED | OUTPATIENT
Start: 2023-10-31 | End: 2023-10-31 | Stop reason: HOSPADM

## 2023-10-31 RX ORDER — SODIUM CHLORIDE, SODIUM LACTATE, POTASSIUM CHLORIDE, CALCIUM CHLORIDE 600; 310; 30; 20 MG/100ML; MG/100ML; MG/100ML; MG/100ML
INJECTION, SOLUTION INTRAVENOUS CONTINUOUS
Status: DISCONTINUED | OUTPATIENT
Start: 2023-10-31 | End: 2023-10-31 | Stop reason: HOSPADM

## 2023-10-31 RX ORDER — PROPOFOL 10 MG/ML
INJECTION, EMULSION INTRAVENOUS PRN
Status: DISCONTINUED | OUTPATIENT
Start: 2023-10-31 | End: 2023-10-31 | Stop reason: SDUPTHER

## 2023-10-31 RX ORDER — OXYCODONE HYDROCHLORIDE 5 MG/1
10 TABLET ORAL PRN
Status: DISCONTINUED | OUTPATIENT
Start: 2023-10-31 | End: 2023-10-31 | Stop reason: HOSPADM

## 2023-10-31 RX ORDER — OXYCODONE HYDROCHLORIDE 5 MG/1
5 TABLET ORAL EVERY 6 HOURS PRN
Qty: 20 TABLET | Refills: 0 | Status: SHIPPED | OUTPATIENT
Start: 2023-10-31 | End: 2023-11-05

## 2023-10-31 RX ORDER — SODIUM CHLORIDE 0.9 % (FLUSH) 0.9 %
5-40 SYRINGE (ML) INJECTION EVERY 12 HOURS SCHEDULED
Status: DISCONTINUED | OUTPATIENT
Start: 2023-10-31 | End: 2023-10-31 | Stop reason: HOSPADM

## 2023-10-31 RX ORDER — ASPIRIN 81 MG/1
81 TABLET ORAL 2 TIMES DAILY
Qty: 120 TABLET | Refills: 0 | Status: SHIPPED | OUTPATIENT
Start: 2023-10-31

## 2023-10-31 RX ORDER — FENTANYL CITRATE 50 UG/ML
100 INJECTION, SOLUTION INTRAMUSCULAR; INTRAVENOUS
Status: COMPLETED | OUTPATIENT
Start: 2023-10-31 | End: 2023-10-31

## 2023-10-31 RX ORDER — DEXAMETHASONE SODIUM PHOSPHATE 10 MG/ML
INJECTION, SOLUTION INTRAMUSCULAR; INTRAVENOUS
Status: COMPLETED | OUTPATIENT
Start: 2023-10-31 | End: 2023-10-31

## 2023-10-31 RX ORDER — ROPIVACAINE HYDROCHLORIDE 5 MG/ML
INJECTION, SOLUTION EPIDURAL; INFILTRATION; PERINEURAL
Status: COMPLETED | OUTPATIENT
Start: 2023-10-31 | End: 2023-10-31

## 2023-10-31 RX ORDER — CEPHALEXIN 500 MG/1
500 CAPSULE ORAL 4 TIMES DAILY
Qty: 12 CAPSULE | Refills: 0 | Status: SHIPPED | OUTPATIENT
Start: 2023-10-31

## 2023-10-31 RX ORDER — HYDROMORPHONE HYDROCHLORIDE 2 MG/ML
0.5 INJECTION, SOLUTION INTRAMUSCULAR; INTRAVENOUS; SUBCUTANEOUS EVERY 5 MIN PRN
Status: DISCONTINUED | OUTPATIENT
Start: 2023-10-31 | End: 2023-10-31 | Stop reason: HOSPADM

## 2023-10-31 RX ORDER — LIDOCAINE HYDROCHLORIDE 20 MG/ML
INJECTION, SOLUTION EPIDURAL; INFILTRATION; INTRACAUDAL; PERINEURAL PRN
Status: DISCONTINUED | OUTPATIENT
Start: 2023-10-31 | End: 2023-10-31 | Stop reason: SDUPTHER

## 2023-10-31 RX ORDER — HYDROMORPHONE HYDROCHLORIDE 2 MG/ML
0.25 INJECTION, SOLUTION INTRAMUSCULAR; INTRAVENOUS; SUBCUTANEOUS EVERY 5 MIN PRN
Status: DISCONTINUED | OUTPATIENT
Start: 2023-10-31 | End: 2023-10-31 | Stop reason: HOSPADM

## 2023-10-31 RX ORDER — LIDOCAINE HYDROCHLORIDE 10 MG/ML
1 INJECTION, SOLUTION INFILTRATION; PERINEURAL
Status: DISCONTINUED | OUTPATIENT
Start: 2023-10-31 | End: 2023-10-31 | Stop reason: HOSPADM

## 2023-10-31 RX ORDER — MIDAZOLAM HYDROCHLORIDE 2 MG/2ML
2 INJECTION, SOLUTION INTRAMUSCULAR; INTRAVENOUS
Status: COMPLETED | OUTPATIENT
Start: 2023-10-31 | End: 2023-10-31

## 2023-10-31 RX ORDER — SODIUM CHLORIDE 0.9 % (FLUSH) 0.9 %
5-40 SYRINGE (ML) INJECTION PRN
Status: DISCONTINUED | OUTPATIENT
Start: 2023-10-31 | End: 2023-10-31 | Stop reason: HOSPADM

## 2023-10-31 RX ORDER — ONDANSETRON 2 MG/ML
4 INJECTION INTRAMUSCULAR; INTRAVENOUS
Status: DISCONTINUED | OUTPATIENT
Start: 2023-10-31 | End: 2023-10-31 | Stop reason: HOSPADM

## 2023-10-31 RX ORDER — DIPHENHYDRAMINE HYDROCHLORIDE 50 MG/ML
12.5 INJECTION INTRAMUSCULAR; INTRAVENOUS
Status: DISCONTINUED | OUTPATIENT
Start: 2023-10-31 | End: 2023-10-31 | Stop reason: HOSPADM

## 2023-10-31 RX ORDER — OXYCODONE HYDROCHLORIDE 5 MG/1
5 TABLET ORAL PRN
Status: DISCONTINUED | OUTPATIENT
Start: 2023-10-31 | End: 2023-10-31 | Stop reason: HOSPADM

## 2023-10-31 RX ADMIN — ROPIVACAINE HYDROCHLORIDE 17 ML: 5 INJECTION, SOLUTION EPIDURAL; INFILTRATION; PERINEURAL at 11:17

## 2023-10-31 RX ADMIN — FENTANYL CITRATE 100 MCG: 50 INJECTION, SOLUTION INTRAMUSCULAR; INTRAVENOUS at 11:17

## 2023-10-31 RX ADMIN — DEXAMETHASONE SODIUM PHOSPHATE 4 MG: 10 INJECTION, SOLUTION INTRAMUSCULAR; INTRAVENOUS at 11:17

## 2023-10-31 RX ADMIN — MEPIVACAINE HYDROCHLORIDE 17 ML: 15 INJECTION, SOLUTION EPIDURAL; INFILTRATION at 11:17

## 2023-10-31 RX ADMIN — SODIUM CHLORIDE, SODIUM LACTATE, POTASSIUM CHLORIDE, AND CALCIUM CHLORIDE: 600; 310; 30; 20 INJECTION, SOLUTION INTRAVENOUS at 12:58

## 2023-10-31 RX ADMIN — PROPOFOL 100 MCG/KG/MIN: 10 INJECTION, EMULSION INTRAVENOUS at 13:05

## 2023-10-31 RX ADMIN — PROPOFOL 50 MG: 10 INJECTION, EMULSION INTRAVENOUS at 13:04

## 2023-10-31 RX ADMIN — DEXAMETHASONE SODIUM PHOSPHATE 4 MG: 10 INJECTION, SOLUTION INTRAMUSCULAR; INTRAVENOUS at 11:23

## 2023-10-31 RX ADMIN — SODIUM CHLORIDE, POTASSIUM CHLORIDE, SODIUM LACTATE AND CALCIUM CHLORIDE: 600; 310; 30; 20 INJECTION, SOLUTION INTRAVENOUS at 11:15

## 2023-10-31 RX ADMIN — Medication 3000 MG: at 13:01

## 2023-10-31 RX ADMIN — ROPIVACAINE HYDROCHLORIDE 10 ML: 5 INJECTION, SOLUTION EPIDURAL; INFILTRATION; PERINEURAL at 11:23

## 2023-10-31 RX ADMIN — LIDOCAINE HYDROCHLORIDE 40 MG: 20 INJECTION, SOLUTION EPIDURAL; INFILTRATION; INTRACAUDAL; PERINEURAL at 13:04

## 2023-10-31 RX ADMIN — MIDAZOLAM 2 MG: 1 INJECTION INTRAMUSCULAR; INTRAVENOUS at 11:17

## 2023-10-31 ASSESSMENT — PAIN SCALES - GENERAL
PAINLEVEL_OUTOF10: 0

## 2023-10-31 ASSESSMENT — ENCOUNTER SYMPTOMS: SHORTNESS OF BREATH: 1

## 2023-10-31 ASSESSMENT — PAIN - FUNCTIONAL ASSESSMENT: PAIN_FUNCTIONAL_ASSESSMENT: 0-10

## 2023-10-31 NOTE — OP NOTE
Operative Note    Patient:Mariann King  MRN: [id number]    Date Of Surgery: 10/31/2023    Surgeon: Burke Mckeon MD    Assistant Surgeon: None    Pre Op Diagnosis:  Pre-Op Diagnosis Codes:     * Posterior tibial tendonitis, left [M76.822]    Left calcaneal osteotomy nonunion  Post Op Diagnosis:   same    Procedures Performed:  Revision left calcaneal osteotomy with distal tibial bone marrow aspirate, 87604    Implants:   Implant Name Type Inv. Item Serial No.  Lot No. LRB No. Used Action   ALLOGRAFT BNE CHIP 1-4 MM 5 CC CRUSH CAN - [id number]  ALLOGRAFT BNE CHIP 1-4 MM 5 CC CRUSH CAN [id number] HOW-  Left 1 Implanted   STAPLE BONE FIX [id number] NIT COMPR W/ Guy Weston FOR - [id number]  STAPLE BONE FIX [id number] NIT COMPR W/ Guy Weston FOR  [employer] [id number] Left 2 Implanted       Anesthesia:  Choice    Blood Loss:  minimal    Tourniquet:  Estimated calf 35 minutes    Pre Operative Abx:   Ancef 2g            Pre Operative Course:  Markus Melchor is a 45 y.o. female who history of nonunion of medial displacement calcaneal osteotomy. Operation In Detail:  Patient was evaluated in the preoperative area. We had a long discussion about the procedure and postoperative protocols. The patient was then brought back to the operating room suite and placed in the operating room table. A timeout was taken to identify the patient, procedure being performed, and laterality. After this the patient was prepped and draped in the normal sterile fashion using a Betadine solution and/or a ChloraPrep solution. A timeout was then taken to identify the patient his name, date of birth, laterality, and procedure being performed. We also identified allergies and any concerns about the operation. Attention was then placed to the operative extremity. A block was placed by the department of anesthesia.   In a preop surgical timeout the left lower extremity was

## 2023-10-31 NOTE — PERIOP NOTE
PACU DISCHARGE NOTE    Pt and family educated on discharge paperwork. No additional questions at this time. Vital signs stable, pain well controlled, alert and oriented times three or at baseline, follow up per surgeon, no anesthetic complications.      Pt has crutches and knee scooter at home

## 2023-10-31 NOTE — ANESTHESIA PROCEDURE NOTES
Peripheral Block    Patient location during procedure: procedure area  Reason for block: post-op pain management and at surgeon's request  Start time: 10/31/2023 11:23 AM  End time: 10/31/2023 11:24 AM  Staffing  Performed: anesthesiologist   Anesthesiologist: India Gray MD  Performed by: India Gray MD  Authorized by: India Gray MD    Preanesthetic Checklist  Completed: patient identified, IV checked, site marked, risks and benefits discussed, surgical/procedural consents, equipment checked, pre-op evaluation, timeout performed, anesthesia consent given, oxygen available and monitors applied/VS acknowledged  Peripheral Block   Patient position: supine  Prep: ChloraPrep  Provider prep: mask  Patient monitoring: cardiac monitor, continuous pulse ox, frequent blood pressure checks, IV access, oxygen and responsive to questions  Block type: Femoral  Adductor canal  Laterality: left  Injection technique: single-shot  Guidance: ultrasound guided    Needle   Needle type: insulated echogenic nerve stimulator needle   Needle gauge: 20 G  Needle localization: ultrasound guidance  Needle length: 10 cm  Assessment   Injection assessment: negative aspiration for heme, no paresthesia on injection, local visualized surrounding nerve on ultrasound and no intravascular symptoms  Paresthesia pain: none  Slow fractionated injection: yes  Hemodynamics: stable  Real-time US image taken/store: yes  Outcomes: uncomplicated and patient tolerated procedure well    Additional Notes  Real time ultrasound used to identify the adductor canal and surrounding structures. Ultrasound image saved.       Medications Administered  dexamethasone (DECADRON) (PF) 10 mg/mL injection - Other   4 mg - 10/31/2023 11:23:00 AM  mepivacaine (CARBOCAINE) injection 1.5% - Perineural   10 mL - 10/31/2023 11:23:00 AM  ropivacaine (NAROPIN) injection 0.5% - Perineural   10 mL - 10/31/2023 11:23:00 AM
11:17:00 AM

## 2023-10-31 NOTE — DISCHARGE INSTRUCTIONS
by your doctor. DIET  Clear liquids until no nausea or vomiting; then light diet for the first day. Advance to regular diet on second day, unless your doctor orders otherwise. If nausea and vomiting continues, call your doctor. PAIN  Take pain medication as directed by your doctor. Call your doctor if pain is NOT relieved by medication. DO NOT take aspirin of blood thinners unless directed by your doctor. MEDICATION INTERACTION:During your procedure you potentially received a medication or medications which may reduce the effectiveness of oral contraceptives. Please consider other forms of contraception for 1 month following your procedure if you are currently using oral contraceptives as your primary form of birth control. In addition to this, we recommend continuing your oral contraceptive as prescribed, unless otherwise instructed by your physician, during this time      [address] IF   Excessive bleeding that does not stop after holding pressure over the area  Temperature of 101 degrees F or above  Excessive redness, swelling or bruising, and/ or green or yellow, smelly discharge from incision    After general anesthesia or intravenous sedation, for 24 hours or while taking prescription Narcotics:  Limit your activities  A responsible adult needs to be with you for the next 24 hours  Do not drive and operate hazardous machinery  Do not make important personal or business decisions  Do not drink alcoholic beverages  If you have not urinated within 8 hours after discharge, and you are experiencing discomfort from urinary retention, please go to the nearest ED. If you have sleep apnea and have a CPAP machine, please use it for all naps and sleeping. Please use caution when taking narcotics and any of your home medications that may cause drowsiness. *  Please give a list of your current medications to your Primary Care Provider.   *  Please update this list whenever your medications are

## 2023-11-13 ENCOUNTER — OFFICE VISIT (OUTPATIENT)
Dept: ORTHOPEDIC SURGERY | Age: 53
End: 2023-11-13

## 2023-11-13 DIAGNOSIS — M76.822 TIBIALIS TENDINITIS OF LEFT LOWER EXTREMITY: Primary | ICD-10-CM

## 2023-11-13 PROCEDURE — 99024 POSTOP FOLLOW-UP VISIT: CPT | Performed by: ORTHOPAEDIC SURGERY

## 2023-11-13 NOTE — PROGRESS NOTES
Name: Vane Stevens  YOB: 1970  Gender: female  MRN: [id number]    Procedure Performed:revision left calcaneal osteotomy with distal tibial bone marrow aspirate - Left        Date of Procedure: 10/31/2023      Subjective: Doing okay      Physical Examination: Sutures removed today. No sign of infection is noted. There is no sign of DVT. Imaging:   No imaging reviewed          Assessment:   Left revision calcaneal osteotomy      Plan:   4 This is a chronic illness/condition with exacerbation and progression  Treatment at this time: Bracing: Placed in: 3D boot  Studies ordered: Foot XR needed @ Next Visit    Weight-bearing status: WBAT        Return to work/work restrictions: none  No medications given      She is placed in a walker boot nonweightbearing today to allow for access to start an stimulator and return in 4 weeks for x-ray.

## 2023-11-20 ENCOUNTER — CLINICAL DOCUMENTATION (OUTPATIENT)
Dept: ORTHOPEDIC SURGERY | Age: 53
End: 2023-11-20

## 2023-11-21 ENCOUNTER — CLINICAL DOCUMENTATION (OUTPATIENT)
Dept: ORTHOPEDIC SURGERY | Age: 53
End: 2023-11-21

## 2023-11-22 ENCOUNTER — CLINICAL DOCUMENTATION (OUTPATIENT)
Dept: ORTHOPEDIC SURGERY | Age: 53
End: 2023-11-22

## 2023-12-13 ENCOUNTER — OFFICE VISIT (OUTPATIENT)
Dept: ORTHOPEDIC SURGERY | Age: 53
End: 2023-12-13

## 2023-12-13 DIAGNOSIS — M76.822 TIBIALIS TENDINITIS OF LEFT LOWER EXTREMITY: Primary | ICD-10-CM

## 2023-12-13 DIAGNOSIS — M96.89 NONUNION OF BONE AFTER OSTEOTOMY: ICD-10-CM

## 2023-12-13 DIAGNOSIS — S92.009K: ICD-10-CM

## 2023-12-13 PROCEDURE — 99024 POSTOP FOLLOW-UP VISIT: CPT | Performed by: NURSE PRACTITIONER

## 2023-12-13 NOTE — PROGRESS NOTES
Name: Everton Pisano  YOB: 1970  Gender: female  MRN: [id number]    Procedure Performed:revision left calcaneal osteotomy with distal tibial bone marrow aspirate - Left           Date of Procedure: 10/31/2023      Subjective: Patient reports that she is done well, she is very happy about progressing with her care and being able to bear weight on the affected extremity. She does report that she could be using the bone stimulator little more regularly. Physical Examination: Incisional areas well-healed. There are no signs of infection. There is  an arch present in her foot. She has palpable pulses and intact sensation to the foot. She does have noted swelling to the posterior heel. She is able to wiggle the lesser toes without difficulty or pain. Imaging:   Interpretation of imaging  Left foot XR: AP, Lateral, Oblique views     ICD-10-CM    1. Tibialis tendinitis of left lower extremity  M76.822 XR FOOT LEFT (MIN 3 VIEWS)      2. 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 Closed displaced fracture of calcaneus with nonunion, unspecified laterality, unspecified portion of calcaneus, subsequent encounter  S92.009K XR FOOT LEFT (MIN 3 VIEWS)         Report: AP, lateral, oblique x-ray of the left foot demonstrates healing osteotomy without hardware failure    Impression: Healing osteotomy without hardware failure   Val Kussmaul, APRN - CNP           Assessment:   Status post revision of left calcaneal osteotomy with bone marrow aspirate. Plan:   3 This is stable chronic illness/condition  Treatment at this time:  patient will continue to wear the walker boot as a matter medical necessity only now she may progressively bear weight on the affected extremity she can tolerate and swelling allows. She will continue to elevate the affected extremity as needed for swelling.   She will continue to use the bone stimulator it was recommended

## 2024-01-22 ENCOUNTER — OFFICE VISIT (OUTPATIENT)
Dept: ORTHOPEDIC SURGERY | Age: 54
End: 2024-01-22

## 2024-01-22 DIAGNOSIS — M96.89 NONUNION OF BONE AFTER OSTEOTOMY: Primary | ICD-10-CM

## 2024-01-22 DIAGNOSIS — M76.822 TIBIALIS TENDINITIS OF LEFT LOWER EXTREMITY: ICD-10-CM

## 2024-01-22 PROCEDURE — 99024 POSTOP FOLLOW-UP VISIT: CPT | Performed by: NURSE PRACTITIONER

## 2024-01-22 NOTE — PROGRESS NOTES
Name: Mariann Gerber  YOB: 1970  Gender: female  MRN: [id number]    Procedure Performed:revision left calcaneal osteotomy with distal tibial bone marrow aspirate - Left           Date of Procedure: 10/31/2023      Subjective: Patient reports that she is done well since her last visit.  She notes overall that the foot feels good, she did have an episode that scared her as she was standing up in her boot and felt sharp shooting pains throughout the heel, she reports the pain lasted about 5 minutes and then resolve itself.      Physical Examination: Incisional area looks great today and is completely well-healed.  She has palpable pulses and intact sensation to the foot.  She no longer has the presence of a planovalgus foot as evidenced by an arch.  Range of motion to the ankle joint today was performed with no pain.  She has mild discomfort with direct palpation to the incision.        Imaging:   Interpretation of imaging  Left foot XR: AP, Lateral, Oblique views     ICD-10-CM    1. 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 Tibialis tendinitis of left lower extremity  M76.822 XR FOOT LEFT (MIN 3 VIEWS)         Report: AP, lateral, oblique x-ray of the left foot demonstrates healing calcaneal osteotomy without hardware failure    Impression: Healing calcaneal osteotomy without hardware failure   Christina Nolan, APRN - CNP           Assessment:   Status post left calcaneal osteotomy revision with bone marrow aspirate insertion after flatfoot reconstruction with hardware failure.  The patient was agreeable to start a home exercise program, if she feels like she is not progressing or as reached a plateau and feels like she needs further strength and mobility training, she will contact us regarding physical therapy.      Plan:   3 This is stable chronic illness/condition  Treatment at this time: Time with no intervention, she will start perform diligent

## 2024-04-01 ENCOUNTER — TELEPHONE (OUTPATIENT)
Dept: ORTHOPEDIC SURGERY | Age: 54
End: 2024-04-01

## 2024-04-01 NOTE — TELEPHONE ENCOUNTER
She is still having problems with walking and it's getting worse instead of better and is also swelling. Please call.

## 2024-04-05 ENCOUNTER — OFFICE VISIT (OUTPATIENT)
Dept: ORTHOPEDIC SURGERY | Age: 54
End: 2024-04-05
Payer: COMMERCIAL

## 2024-04-05 DIAGNOSIS — S92.009K: ICD-10-CM

## 2024-04-05 DIAGNOSIS — M96.89 NONUNION OF BONE AFTER OSTEOTOMY: ICD-10-CM

## 2024-04-05 DIAGNOSIS — M76.822 TIBIALIS TENDINITIS OF LEFT LOWER EXTREMITY: Primary | ICD-10-CM

## 2024-04-05 DIAGNOSIS — M72.2 PLANTAR FASCIITIS, LEFT: ICD-10-CM

## 2024-04-05 PROCEDURE — 99214 OFFICE O/P EST MOD 30 MIN: CPT | Performed by: ORTHOPAEDIC SURGERY

## 2024-04-05 NOTE — PROGRESS NOTES
Name: Mariann Gerber  YOB: 1970  Gender: female  MRN: [id number]    Summary:   Left new onset plantar fasciitis       CC: Follow-up (Revision left calcaneal osteotomy with distal tibial bone marrow aspirate 10/31/23/Patient complains of pain and swelling at incision site/XR obtained in office today)       HPI: Mariann Gerber is a 53 y.o. female who presents with Follow-up (Revision left calcaneal osteotomy with distal tibial bone marrow aspirate 10/31/23/Patient complains of pain and swelling at incision site/XR obtained in office today)  .  This patient presents the office today with a several week history of pain located plantar aspect of her left heel.  She has a complicated history of this calcaneus with a nonunion of an osteotomy and subsequent revision.  She has had a CT scan showing bridging fusion back last year of this.    History was obtained by Patient     ROS/Meds/PSH/PMH/FH/SH: I personally reviewed the patients standard intake form.  Below are the pertinents    Tobacco:  reports that she has never smoked. She has never used smokeless tobacco.  Diabetes: None      Physical Examination:    Exam of the left lower extremity shows very little swelling or tenderness over the osteotomy site.  She has palpable pulses and intact sensation.  He has good subtalar joint and tibiotalar joint range of motion.  She is tenderness palpation is at the plantar medial heel without evidence of fascial insufficiency.  She has palpable pulses and intact sensation.    Imaging:   Interpretation of imaging  Left foot XR: AP, Lateral, Oblique views     ICD-10-CM    1. Tibialis tendinitis of left lower extremity  M76.822 XR FOOT LEFT (MIN 3 VIEWS)      2. 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 Closed displaced fracture of calcaneus with nonunion, unspecified laterality, unspecified portion of calcaneus, subsequent encounter  S92.009K XR FOOT LEFT (MIN 3 VIEWS)

## 2024-07-25 ENCOUNTER — PATIENT MESSAGE (OUTPATIENT)
Dept: ORTHOPEDIC SURGERY | Age: 54
End: 2024-07-25

## 2024-07-25 NOTE — TELEPHONE ENCOUNTER
From: Mariann Gerber  To: Christina Nolan  Sent: 7/25/2024 2:11 PM EDT  Subject: Appointment Request    Appointment Request From: Mariann Gerber    With Provider: NATHALY George CNP [[facility]]    Preferred Date Range: Any    Preferred Times: Any Time    Reason for visit: Request an Appointment    Comments:  Eval LT foot for pain, scar swelling, walking/gait

## 2024-07-25 NOTE — TELEPHONE ENCOUNTER
From: Mariann Gerber  To: Christina Nolan  Sent: 7/25/2024 1:55 PM EDT  Subject: Appointment Request    Appointment Request From: Mariann Gerber    With Provider: NATHALY George CNP [[facility]]    Preferred Date Range: 8/2/2024 – 8/9/2024    Preferred Times: Thursday Afternoon, Friday Morning    Reason for visit: Request an Appointment    Comments:  Eval LT foot pain, scar/swelling, LT calf pain

## 2024-08-21 ENCOUNTER — OFFICE VISIT (OUTPATIENT)
Dept: ORTHOPEDIC SURGERY | Age: 54
End: 2024-08-21
Payer: COMMERCIAL

## 2024-08-21 DIAGNOSIS — G57.82 DISORDER OF LEFT SURAL NERVE: Primary | ICD-10-CM

## 2024-08-21 PROCEDURE — 99214 OFFICE O/P EST MOD 30 MIN: CPT | Performed by: ORTHOPAEDIC SURGERY

## 2024-08-21 NOTE — PROGRESS NOTES
Name: Mariann Gerber  YOB: 1970  Gender: female  MRN: [id number]    Summary:   Left sural neuritis       CC: Follow-up (Left stable calcaneus osteotomy, left new onset plantar fasciitis, still struggling )       HPI: Mariann Gerber is a 53 y.o. female who presents with Follow-up (Left stable calcaneus osteotomy, left new onset plantar fasciitis, still struggling )  .  This patient was brought to the operative continued plaints of a burning sensation lateral aspect of her foot and leg.  She has been able to go to the gym and workout lose 36 pounds which is great.    History was obtained by Patient     ROS/Meds/PSH/PMH/FH/SH: I personally reviewed the patients standard intake form.  Below are the pertinents    Tobacco:  reports that she has never smoked. She has never used smokeless tobacco.  Diabetes: None      Physical Examination:  Exam of the left lower extremity shows tenderness palpation over the sural nerve area.  She has good positioning of the foot and well-healed surgical incision.  The plantar fascia pain is resolved.      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  In addition we have also discussed the sural neurectomy more proximal in the leg as well as the creation of permanent numbness in the lateral aspect of the foot and the risk of incomplete pain relief from this.  She understands and would like to hold off and continue to observe this for now.

## 2024-09-06 ENCOUNTER — HOSPITAL ENCOUNTER (OUTPATIENT)
Dept: CT IMAGING | Age: 54
Discharge: HOME | End: 2024-09-09
Payer: COMMERCIAL

## 2024-09-06 DIAGNOSIS — R91.8 MASS OF RIGHT LUNG: ICD-10-CM

## 2024-09-06 PROCEDURE — 71260 CT THORAX DX C+: CPT

## 2024-09-06 PROCEDURE — 6360000004 HC RX CONTRAST MEDICATION: Performed by: STUDENT IN AN ORGANIZED HEALTH CARE EDUCATION/TRAINING PROGRAM

## 2024-09-06 RX ORDER — IOPAMIDOL 755 MG/ML
70 INJECTION, SOLUTION INTRAVASCULAR
Status: COMPLETED | OUTPATIENT
Start: 2024-09-06 | End: 2024-09-06

## 2024-09-06 RX ADMIN — IOPAMIDOL 70 ML: 755 INJECTION, SOLUTION INTRAVENOUS at 16:12

## (undated) DEVICE — GLOVE SURG SZ 8 L12IN FNGR THK79MIL GRN LTX FREE

## (undated) DEVICE — FOOT DR TOLLISON & WOMACK: Brand: MEDLINE INDUSTRIES, INC.

## (undated) DEVICE — Device

## (undated) DEVICE — PRECISION THIN (13.0 X 0.38 X 34.5MM)

## (undated) DEVICE — ZIMMER® STERILE DISPOSABLE TOURNIQUET CUFF WITH PLC, DUAL PORT, SINGLE BLADDER, 18 IN. (46 CM)

## (undated) DEVICE — DRESSING PETRO W3XL8IN OIL EMUL N ADH GZ KNIT IMPREG CELOS

## (undated) DEVICE — SOLUTION IRRIG 1000ML 0.9% SOD CHL USP POUR PLAS BTL

## (undated) DEVICE — BNDG,ELSTC,MATRIX,STRL,3"X5YD,LF,HOOK&LP: Brand: MEDLINE

## (undated) DEVICE — BANDAGE COMPR L5YDXW2IN FOAM CO FLX

## (undated) DEVICE — TRAY BNE MAR CPRP BMA PLT SENS 1 BTTN AUTOMATION USED TO

## (undated) DEVICE — GLOVE SURG SZ 65 CRM LTX FREE POLYISOPRENE POLYMER BEAD ANTI

## (undated) DEVICE — DRAPE C ARM W54XL84IN MINI FOR OEC 6800

## (undated) DEVICE — GOWN,SIRUS,NONRNF,SETINSLV,XL,20/CS: Brand: MEDLINE

## (undated) DEVICE — BANDAGE,ELASTIC,ESMARK,STERILE,4"X9',LF: Brand: MEDLINE

## (undated) DEVICE — GUIDEWIRE ORTH L12IN DIA2.4MM NTHRD TRCR TIP DISP FOR 5TH

## (undated) DEVICE — SUTURE NONABSORBABLE MONOFILAMENT 3-0 PS-1 18 IN BLK ETHILON 1663H

## (undated) DEVICE — KIT INSTR W/ 2.4MM GUIDEPIN SUT PASS WIRE NO2 FIBERWIRE

## (undated) DEVICE — PADDING CAST W2INXL4YD ST COT COHESIVE HND TEARABLE SPEC

## (undated) DEVICE — SPLINT THMB W4XL30IN FBRGLS PD PRECUT LTWT DURABLE FAST SET

## (undated) DEVICE — DRAPE, FILM SHEET, 44X65 STERILE: Brand: MEDLINE

## (undated) DEVICE — SUTURE MCRYL SZ 3-0 L27IN ABSRB UD L24MM PS-1 3/8 CIR PRIM Y936H

## (undated) DEVICE — SUTURE FIBERWIRE SZ 2 W/ TAPERED NEEDLE BLUE L38IN NONABSORB BLU L26.5MM 1/2 CIRCLE AR7200

## (undated) DEVICE — FOOT & ANKLE SOFT DR WOMACK: Brand: MEDLINE INDUSTRIES, INC.

## (undated) DEVICE — BANDAGE COBAN 6 IN WND 6INX5YD FOAM

## (undated) DEVICE — SUTURE VCRL SZ 2-0 L27IN ABSRB UD L26MM CT-2 1/2 CIR J269H

## (undated) DEVICE — GLOVE SURG SZ 65 L12IN FNGR THK79MIL GRN LTX FREE

## (undated) DEVICE — BANDAGE,GAUZE,CONFORMING,2"X75",STRL,LF: Brand: MEDLINE INDUSTRIES, INC.